# Patient Record
Sex: MALE | URBAN - METROPOLITAN AREA
[De-identification: names, ages, dates, MRNs, and addresses within clinical notes are randomized per-mention and may not be internally consistent; named-entity substitution may affect disease eponyms.]

---

## 2022-06-09 ENCOUNTER — APPOINTMENT (OUTPATIENT)
Age: 87
Setting detail: DERMATOLOGY
End: 2022-06-09

## 2022-06-09 DIAGNOSIS — L21.8 OTHER SEBORRHEIC DERMATITIS: ICD-10-CM

## 2022-06-09 DIAGNOSIS — L57.0 ACTINIC KERATOSIS: ICD-10-CM

## 2022-06-09 DIAGNOSIS — Z71.89 OTHER SPECIFIED COUNSELING: ICD-10-CM

## 2022-06-09 DIAGNOSIS — D18.0 HEMANGIOMA: ICD-10-CM

## 2022-06-09 DIAGNOSIS — L85.3 XEROSIS CUTIS: ICD-10-CM

## 2022-06-09 DIAGNOSIS — D22 MELANOCYTIC NEVI: ICD-10-CM

## 2022-06-09 DIAGNOSIS — L82.1 OTHER SEBORRHEIC KERATOSIS: ICD-10-CM

## 2022-06-09 DIAGNOSIS — L81.4 OTHER MELANIN HYPERPIGMENTATION: ICD-10-CM

## 2022-06-09 PROBLEM — D18.01 HEMANGIOMA OF SKIN AND SUBCUTANEOUS TISSUE: Status: ACTIVE | Noted: 2022-06-09

## 2022-06-09 PROBLEM — D22.5 MELANOCYTIC NEVI OF TRUNK: Status: ACTIVE | Noted: 2022-06-09

## 2022-06-09 PROCEDURE — 99213 OFFICE O/P EST LOW 20 MIN: CPT | Mod: 25

## 2022-06-09 PROCEDURE — OTHER COUNSELING: OTHER

## 2022-06-09 PROCEDURE — OTHER LIQUID NITROGEN: OTHER

## 2022-06-09 PROCEDURE — OTHER MIPS QUALITY: OTHER

## 2022-06-09 PROCEDURE — 17003 DESTRUCT PREMALG LES 2-14: CPT

## 2022-06-09 PROCEDURE — OTHER PRESCRIPTION MEDICATION MANAGEMENT: OTHER

## 2022-06-09 PROCEDURE — OTHER REASSURANCE: OTHER

## 2022-06-09 PROCEDURE — 17000 DESTRUCT PREMALG LESION: CPT

## 2022-06-09 PROCEDURE — OTHER PRESCRIPTION: OTHER

## 2022-06-09 RX ORDER — KETOCONAZOLE 20 MG/ML
SHAMPOO, SUSPENSION TOPICAL BIW
Qty: 120 | Refills: 6 | Status: ERX | COMMUNITY
Start: 2022-06-09

## 2022-06-09 ASSESSMENT — LOCATION DETAILED DESCRIPTION DERM
LOCATION DETAILED: RIGHT ANTERIOR LATERAL MALLEOLUS
LOCATION DETAILED: RIGHT INFERIOR MEDIAL UPPER BACK
LOCATION DETAILED: LEFT INFERIOR UPPER BACK
LOCATION DETAILED: RIGHT SUPERIOR UPPER BACK
LOCATION DETAILED: LEFT ULNAR DORSAL HAND
LOCATION DETAILED: LEFT ULNAR DORSAL HAND
LOCATION DETAILED: RIGHT ELBOW
LOCATION DETAILED: LEFT SUPERIOR UPPER BACK
LOCATION DETAILED: LEFT SUPERIOR MEDIAL LOWER BACK
LOCATION DETAILED: LEFT SUPERIOR HELIX
LOCATION DETAILED: RIGHT MID-UPPER BACK
LOCATION DETAILED: LEFT DISTAL DORSAL FOREARM
LOCATION DETAILED: LEFT SCAPHA
LOCATION DETAILED: LEFT ANKLE
LOCATION DETAILED: RIGHT LATERAL ABDOMEN
LOCATION DETAILED: LEFT MID-UPPER BACK
LOCATION DETAILED: RIGHT DISTAL DORSAL FOREARM
LOCATION DETAILED: HAIR
LOCATION DETAILED: RIGHT INFERIOR LATERAL MIDBACK
LOCATION DETAILED: INFERIOR THORACIC SPINE
LOCATION DETAILED: LEFT ELBOW
LOCATION DETAILED: HAIR
LOCATION DETAILED: LEFT POSTERIOR SHOULDER
LOCATION DETAILED: LEFT LATERAL ABDOMEN
LOCATION DETAILED: RIGHT POSTERIOR SHOULDER
LOCATION DETAILED: EPIGASTRIC SKIN

## 2022-06-09 ASSESSMENT — LOCATION SIMPLE DESCRIPTION DERM
LOCATION SIMPLE: RIGHT SHOULDER
LOCATION SIMPLE: LEFT FOREARM
LOCATION SIMPLE: HAIR
LOCATION SIMPLE: LEFT HAND
LOCATION SIMPLE: RIGHT UPPER BACK
LOCATION SIMPLE: HAIR
LOCATION SIMPLE: RIGHT FOREARM
LOCATION SIMPLE: RIGHT ANKLE
LOCATION SIMPLE: LEFT EAR
LOCATION SIMPLE: LEFT HAND
LOCATION SIMPLE: LEFT ELBOW
LOCATION SIMPLE: LEFT UPPER BACK
LOCATION SIMPLE: ABDOMEN
LOCATION SIMPLE: UPPER BACK
LOCATION SIMPLE: LEFT SHOULDER
LOCATION SIMPLE: LEFT LOWER BACK
LOCATION SIMPLE: LEFT EAR
LOCATION SIMPLE: RIGHT LOWER BACK
LOCATION SIMPLE: RIGHT ELBOW
LOCATION SIMPLE: LEFT ANKLE

## 2022-06-09 ASSESSMENT — LOCATION ZONE DERM
LOCATION ZONE: SCALP
LOCATION ZONE: TRUNK
LOCATION ZONE: HAND
LOCATION ZONE: ARM
LOCATION ZONE: EAR
LOCATION ZONE: SCALP
LOCATION ZONE: EAR
LOCATION ZONE: LEG
LOCATION ZONE: TRUNK
LOCATION ZONE: HAND
LOCATION ZONE: ARM

## 2022-06-09 NOTE — PROCEDURE: PRESCRIPTION MEDICATION MANAGEMENT
Detail Level: Zone
Render In Strict Bullet Format?: No
Continue Regimen: Continue Ketoconazole shampoo three times a week

## 2023-06-12 ENCOUNTER — APPOINTMENT (OUTPATIENT)
Dept: URBAN - METROPOLITAN AREA CLINIC 250 | Age: 88
Setting detail: DERMATOLOGY
End: 2023-06-12

## 2023-06-12 ENCOUNTER — RX ONLY (RX ONLY)
Age: 88
End: 2023-06-12

## 2023-06-12 DIAGNOSIS — L82.1 OTHER SEBORRHEIC KERATOSIS: ICD-10-CM

## 2023-06-12 DIAGNOSIS — Z71.89 OTHER SPECIFIED COUNSELING: ICD-10-CM

## 2023-06-12 DIAGNOSIS — D485 NEOPLASM OF UNCERTAIN BEHAVIOR OF SKIN: ICD-10-CM

## 2023-06-12 DIAGNOSIS — L81.4 OTHER MELANIN HYPERPIGMENTATION: ICD-10-CM

## 2023-06-12 DIAGNOSIS — D18.0 HEMANGIOMA: ICD-10-CM

## 2023-06-12 DIAGNOSIS — D22 MELANOCYTIC NEVI: ICD-10-CM

## 2023-06-12 PROBLEM — D22.5 MELANOCYTIC NEVI OF TRUNK: Status: ACTIVE | Noted: 2023-06-12

## 2023-06-12 PROBLEM — D18.01 HEMANGIOMA OF SKIN AND SUBCUTANEOUS TISSUE: Status: ACTIVE | Noted: 2023-06-12

## 2023-06-12 PROBLEM — D48.5 NEOPLASM OF UNCERTAIN BEHAVIOR OF SKIN: Status: ACTIVE | Noted: 2023-06-12

## 2023-06-12 PROCEDURE — OTHER REASSURANCE: OTHER

## 2023-06-12 PROCEDURE — OTHER MIPS QUALITY: OTHER

## 2023-06-12 PROCEDURE — 11102 TANGNTL BX SKIN SINGLE LES: CPT

## 2023-06-12 PROCEDURE — A4550 SURGICAL TRAYS: HCPCS

## 2023-06-12 PROCEDURE — 99213 OFFICE O/P EST LOW 20 MIN: CPT | Mod: 25

## 2023-06-12 PROCEDURE — OTHER COUNSELING: OTHER

## 2023-06-12 PROCEDURE — OTHER BIOPSY BY SHAVE METHOD: OTHER

## 2023-06-12 RX ORDER — KETOCONAZOLE 20 MG/ML
SHAMPOO, SUSPENSION TOPICAL BIW
Qty: 120 | Refills: 6 | Status: ERX

## 2023-06-12 ASSESSMENT — LOCATION DETAILED DESCRIPTION DERM
LOCATION DETAILED: RIGHT INFERIOR MEDIAL UPPER BACK
LOCATION DETAILED: RIGHT NASAL SIDEWALL
LOCATION DETAILED: INFERIOR THORACIC SPINE
LOCATION DETAILED: EPIGASTRIC SKIN
LOCATION DETAILED: RIGHT DISTAL DORSAL FOREARM
LOCATION DETAILED: LEFT DISTAL DORSAL FOREARM
LOCATION DETAILED: LEFT POSTERIOR SHOULDER
LOCATION DETAILED: RIGHT MID-UPPER BACK
LOCATION DETAILED: RIGHT SUPERIOR UPPER BACK
LOCATION DETAILED: RIGHT SUPERIOR MEDIAL MIDBACK
LOCATION DETAILED: PERIUMBILICAL SKIN

## 2023-06-12 ASSESSMENT — LOCATION ZONE DERM
LOCATION ZONE: ARM
LOCATION ZONE: TRUNK
LOCATION ZONE: NOSE

## 2023-06-12 ASSESSMENT — LOCATION SIMPLE DESCRIPTION DERM
LOCATION SIMPLE: RIGHT NOSE
LOCATION SIMPLE: RIGHT UPPER BACK
LOCATION SIMPLE: ABDOMEN
LOCATION SIMPLE: RIGHT FOREARM
LOCATION SIMPLE: LEFT SHOULDER
LOCATION SIMPLE: RIGHT LOWER BACK
LOCATION SIMPLE: UPPER BACK
LOCATION SIMPLE: LEFT FOREARM

## 2023-06-12 NOTE — PROCEDURE: BIOPSY BY SHAVE METHOD
Silver Nitrate Text: The wound bed was treated with silver nitrate after the biopsy was performed.
Hide Anesthesia Volume?: No
Bill For Surgical Tray: yes
Dressing: bandage
Depth Of Biopsy: dermis
Wound Care: Petrolatum
Electrodesiccation And Curettage Text: The wound bed was treated with electrodesiccation and curettage after the biopsy was performed.
Electrodesiccation Text: The wound bed was treated with electrodesiccation after the biopsy was performed.
Notification Instructions: Patient will be notified of biopsy results. However, patient instructed to call the office if not contacted within 2 weeks.
Cryotherapy Text: The wound bed was treated with cryotherapy after the biopsy was performed.
Biopsy Type: H and E
Curettage Text: The wound bed was treated with curettage after the biopsy was performed.
Anesthesia Volume In Cc (Will Not Render If 0): 0.5
Hemostasis: Otto's
Information: Selecting Yes will display possible errors in your note based on the variables you have selected. This validation is only offered as a suggestion for you. PLEASE NOTE THAT THE VALIDATION TEXT WILL BE REMOVED WHEN YOU FINALIZE YOUR NOTE. IF YOU WANT TO FAX A PRELIMINARY NOTE YOU WILL NEED TO TOGGLE THIS TO 'NO' IF YOU DO NOT WANT IT IN YOUR FAXED NOTE.
X Size Of Lesion In Cm: 0.3
Additional Anesthesia Volume In Cc (Will Not Render If 0): 0
Consent: Written consent was obtained and risks were reviewed including but not limited to scarring, infection, bleeding, scabbing, incomplete removal, nerve damage and allergy to anesthesia.
Post-Care Instructions: I reviewed with the patient in detail post-care instructions. Patient is to keep the biopsy site dry overnight, and then apply bacitracin twice daily until healed. Patient may apply hydrogen peroxide soaks to remove any crusting.
Detail Level: Detailed
Biopsy Method: Dermablade
Billing Type: Third-Party Bill
Anesthesia Type: 1% lidocaine with epinephrine
Type Of Destruction Used: Curettage

## 2023-07-06 ENCOUNTER — APPOINTMENT (OUTPATIENT)
Dept: URBAN - METROPOLITAN AREA CLINIC 250 | Age: 88
Setting detail: DERMATOLOGY
End: 2023-07-07

## 2023-07-06 PROBLEM — C44.311 BASAL CELL CARCINOMA OF SKIN OF NOSE: Status: ACTIVE | Noted: 2023-07-06

## 2023-07-06 PROCEDURE — G6001 ECHO GUIDANCE RADIOTHERAPY: HCPCS

## 2023-07-06 PROCEDURE — OTHER IMAGE GUIDED - SUPERFICIAL RADIOTHERAPY: SIMULATION VISIT: OTHER

## 2023-07-06 PROCEDURE — 77333 RADIATION TREATMENT AID(S): CPT

## 2023-07-06 PROCEDURE — 77280 THER RAD SIMULAJ FIELD SMPL: CPT

## 2023-07-06 PROCEDURE — 77300 RADIATION THERAPY DOSE PLAN: CPT

## 2023-07-06 PROCEDURE — 77261 THER RADIOLOGY TX PLNG SMPL: CPT

## 2023-07-06 PROCEDURE — OTHER IMAGE GUIDED - SUPERFICIAL RADIOTHERAPY: OTHER

## 2023-07-06 NOTE — PROCEDURE: IMAGE GUIDED - SUPERFICIAL RADIOTHERAPY: SIMULATION VISIT
Simulation Documentation: Per the request of Dr. Owusu the patient was seen today for Superficial Radiation Therapy planning requiring initial simulation in preparation for treatment of specific diseased sites. Simulation is necessary to determine the correct patient and treatment portal positioning, to deliver safe and effective Radiotherapy. A high-frequency ultrasound image was acquired prior to treatment today for two- dimensional evaluation of tumor volume and will be repeated per fraction for response to treatment, in addition, to geometric accuracy of field placement. Physician evaluation of the ultrasound tumor depth, width, and breadth will be ongoing through the course of treatment and is deemed medically necessary ensuring efficacy of treatment and determine whether to proceed with delivery of therapeutic. Today’s image and setup were evaluated to determine the initiation of treatment. All appropriate blocking and treatment parameters were verified by the radiation therapist and provider.\\n\\nPer Dr. Owusu, continued per fraction ultrasound guidance and simulation are required for field placement, measurement of tumor depth, width and breadth, progress, and edema monitoring.\\n\\nPer the request of Dr. Owusu , continuing medical physics review as per radiotherapy standard of care post every 5th fraction for the patient, including assessment of treatment parameters,  of dose delivery, and review of patient treatment documentation in support of the provider, is ordered, ensuring efficacy and continued safe delivery of radiotherapy. Included in the physics check is a review of patient setup information, all pertinent simulation and treatment photograph(s) checks, prescription, dose calculation verification, per fraction dose charted correctly, elapsed days and treatment days correctly charted, cumulative dose correct, and review of any prescription changes. Continued medical physics review post every 5th fraction of radiotherapy is requested by the provider for appropriate radiotherapy management and is deemed medically necessary and standard of care.

## 2023-07-06 NOTE — PROCEDURE: IMAGE GUIDED - SUPERFICIAL RADIOTHERAPY: SIMULATION VISIT
Bill For Treatment Planning: Yes- (Simple Planning- 1 Site: [id number]) Bill For Treatment Planning: Yes- (Simple Planning- 1 Site: [id number])

## 2023-07-06 NOTE — PROCEDURE: IMAGE GUIDED - SUPERFICIAL RADIOTHERAPY
Add Treatment Time Calculation?: No
Lesion Dimensions-X Axis In Cm: 0.3
Bill For Physics Consultation: No - Render Text Only
Time, Dose, Fractionation Factor (Tdf) For Prescription 1: 98
Additional Fraction(S) Needed:: 0
Treatment Time (Min): 0.47
Energy (Kv): 100
Total Dose For Prescription 1 (Cgy): 5517.8
Shield Size In Cm: 1.5
Field Number: 1
Applicator Size In Cm: 2.0 cm
Physics Documentation: (Physics Check Verbiage)
Lesion Margin Size In Cm: 0.5
Treatments Per Week: 3
Number Of Fractions For Prescription 1: 20
Detail Level: Detailed
Daily Dose (Cgy): 275.89

## 2023-07-06 NOTE — PROCEDURE: IMAGE GUIDED - SUPERFICIAL RADIOTHERAPY: SIMULATION VISIT
Additional Comments (Add Customization Of Note Here): Treatment area shows a well healed biopsy site.

## 2023-07-11 ENCOUNTER — APPOINTMENT (OUTPATIENT)
Dept: URBAN - METROPOLITAN AREA CLINIC 250 | Age: 88
Setting detail: DERMATOLOGY
End: 2023-07-12

## 2023-07-11 PROBLEM — C44.311 BASAL CELL CARCINOMA OF SKIN OF NOSE: Status: ACTIVE | Noted: 2023-07-11

## 2023-07-11 PROCEDURE — OTHER IMAGE GUIDED - SUPERFICIAL RADIOTHERAPY: TREATMENT VISIT: OTHER

## 2023-07-11 PROCEDURE — OTHER IMAGE GUIDED - SUPERFICIAL RADIOTHERAPY: OTHER

## 2023-07-11 PROCEDURE — 77401 RADIATION TX DELIVERY SUPFC: CPT

## 2023-07-11 PROCEDURE — 77280 THER RAD SIMULAJ FIELD SMPL: CPT

## 2023-07-11 PROCEDURE — G6001 ECHO GUIDANCE RADIOTHERAPY: HCPCS | Mod: XU

## 2023-07-11 NOTE — PROCEDURE: IMAGE GUIDED - SUPERFICIAL RADIOTHERAPY
Time, Dose, Fractionation Factor (Tdf) For Prescription 1: 98
Applicator Size In Cm: 2.0 cm
Add A Fifth Interruption?: No
Lesion Margin Size In Cm: 0.5
Detail Level: Detailed
Lesion Dimensions-Y Axis In Cm: 0.3
Field Number: 1
Total Dose For Prescription 1 (Cgy): 5517.8
Number Of Fractions For Prescription 1: 20
Shield Size In Cm: 1.5
Daily Dose (Cgy): 275.89
Treatment Time (Min): 0.47
Energy (Kv): 100
Additional Fraction(S) Needed:: 0
Treatments Per Week: 3
Bill For Physics Consultation: No - Render Text Only
Physics Documentation: (Physics Check Verbiage)

## 2023-07-11 NOTE — PROCEDURE: IMAGE GUIDED - SUPERFICIAL RADIOTHERAPY: TREATMENT VISIT
Was Ultrasound Performed Today?: Yes
Additional Change To Daily Dosage Administered Mid Treatment?: No
Treatment Documentation: This patient has been treated today with image-guided superficial radiation therapy for non-melanoma skin cancer. Written informed consent has been previously obtained from this patient for this treatment. This consent is documented in the patient's chart. The patient gave verbal consent to continue treatment today. The patient was treated with a specific radiation dose and setup as prescribed by the provider listed on this visit note. A Radiation Therapist performed administration of radiation under the supervision of a provider. The treatment parameters and cumulative dose are indicated above. Prior to administering the radiation, the patient underwent a verification therapeutic radiology simulation-aided field setting defining relevant normal and abnormal target anatomy and acquiring images with separate and distinct diagnostic high-frequency ultrasound to delineate tissues and determine whether to proceed with delivery of therapeutic, in addition to retrieve data necessary to develop an optimal radiation treatment process for the patient. The field placement simulation documents any change seen in overall tumor volume documented in the patient’s record, allows the clinician to indicate any needed changes in the treatment plan and/or prescription, provides diagnostic evaluation as the basis for performing the therapeutic procedure, and clearly identifies the information needed to decide to proceed with the therapeutic procedure. This process includes verification of the treatment port(s) and proper treatment positioning. All treatment ports were photographed within electronic medical records. The patient's lead blocking along with gross tumor volume and margin was confirmed. Considering superficial radiotherapy is clinical in setup, this requires the physician and radiation therapist to clarify the location interest being treated against initial images, ultrasound, pathology, and patient anatomy. Care was taken to ensure torres treated were geometrically accurate and properly positioned using therapeutic radiology simulation-aided field setting verification per fraction. This process is also utilized to determine if any prescription or setup changes are necessary. These steps are therefore medically necessary to ensure safe and effective administration of radiation. Ongoing therapeutic radiology simulation-aided field setting verification is ordered throughout the course of therapy.\\n\\nA high-frequency ultrasound image was acquired today for a two-dimensional evaluation of the tumor volume, depth, width, breadth, review of prior response to treatment, provide geometric accuracy of field placement, and determine whether to proceed with therapeutic delivery.\\n\\nThe field placement and ultrasound imaging, per fraction, is separate and distinct from the initial simulation and is an important task in providing safe administration of superficial radiation therapy. Physician evaluation of the ultrasound information will be ongoing throughout the course of treatment and is deemed medically necessary to ensure the efficacy of treatment, whether to proceed with therapeutic delivery, and determine any necessary changes. Today's images were evaluated for determination of continuation of treatment with the current plan or with necessary changes as appropriate. Additionally, the use of ultrasound visualization and targeted assessment allows the patient to be able to see their cancer(s) progress, encouraging the patient to complete and maintain compliance through the full course of prescribed radiotherapy. Per Dr. Owusu, continued ultrasound guidance and therapeutic radiology simulation-aided field setting verification per fraction is required for field placement, measurement of tumor depth, tissue evaluation, progress, acute effect monitoring, and determination for therapeutic treatment delivery is appropriate.
Daily Dosage (Cgy): 275.89
Calculate Total Cumulative Dose Automatically Or Manually: Manually
Additional Comments (Add Customization Of Note Here): Treatment area shows a well healed biopsy site.
Ultrasound Not Used Text: Ultrasound was not performed today due to
Ultrasound Used Text: High frequency ultrasound depth is 0.95 mm, which is 0.05 mm in difference from previous imaging.
Fraction Number: 1
Energy (Kv): 100
Add X Modifier?: RYAN - Unusual Non-Overlapping Service

## 2023-07-12 ENCOUNTER — APPOINTMENT (OUTPATIENT)
Dept: URBAN - METROPOLITAN AREA CLINIC 250 | Age: 88
Setting detail: DERMATOLOGY
End: 2023-07-16

## 2023-07-12 PROBLEM — C44.311 BASAL CELL CARCINOMA OF SKIN OF NOSE: Status: ACTIVE | Noted: 2023-07-12

## 2023-07-12 PROCEDURE — OTHER IMAGE GUIDED - SUPERFICIAL RADIOTHERAPY: OTHER

## 2023-07-12 PROCEDURE — G6001 ECHO GUIDANCE RADIOTHERAPY: HCPCS | Mod: XU

## 2023-07-12 PROCEDURE — 77280 THER RAD SIMULAJ FIELD SMPL: CPT

## 2023-07-12 PROCEDURE — OTHER IMAGE GUIDED - SUPERFICIAL RADIOTHERAPY: TREATMENT VISIT: OTHER

## 2023-07-12 PROCEDURE — 77401 RADIATION TX DELIVERY SUPFC: CPT

## 2023-07-12 NOTE — PROCEDURE: IMAGE GUIDED - SUPERFICIAL RADIOTHERAPY: TREATMENT VISIT
Bill For Treatment (35006)?: Yes
Daily Dosage (Cgy): 275.89
Additional Change To Daily Dosage Administered Mid Treatment?: No
Ultrasound Not Used Text: Ultrasound was not performed today due to
Add X Modifier?: RYAN - Unusual Non-Overlapping Service
Calculate Total Cumulative Dose Automatically Or Manually: Manually
Total Cumulative Dose (Cgy): 551.78
Treatment Documentation: This patient has been treated today with image-guided superficial radiation therapy for non-melanoma skin cancer. Written informed consent has been previously obtained from this patient for this treatment. This consent is documented in the patient's chart. The patient gave verbal consent to continue treatment today. The patient was treated with a specific radiation dose and setup as prescribed by the provider listed on this visit note. A Radiation Therapist performed administration of radiation under the supervision of a provider. The treatment parameters and cumulative dose are indicated above. Prior to administering the radiation, the patient underwent a verification therapeutic radiology simulation-aided field setting defining relevant normal and abnormal target anatomy and acquiring images with separate and distinct diagnostic high-frequency ultrasound to delineate tissues and determine whether to proceed with delivery of therapeutic, in addition to retrieve data necessary to develop an optimal radiation treatment process for the patient. The field placement simulation documents any change seen in overall tumor volume documented in the patient’s record, allows the clinician to indicate any needed changes in the treatment plan and/or prescription, provides diagnostic evaluation as the basis for performing the therapeutic procedure, and clearly identifies the information needed to decide to proceed with the therapeutic procedure. This process includes verification of the treatment port(s) and proper treatment positioning. All treatment ports were photographed within electronic medical records. The patient's lead blocking along with gross tumor volume and margin was confirmed. Considering superficial radiotherapy is clinical in setup, this requires the physician and radiation therapist to clarify the location interest being treated against initial images, ultrasound, pathology, and patient anatomy. Care was taken to ensure torres treated were geometrically accurate and properly positioned using therapeutic radiology simulation-aided field setting verification per fraction. This process is also utilized to determine if any prescription or setup changes are necessary. These steps are therefore medically necessary to ensure safe and effective administration of radiation. Ongoing therapeutic radiology simulation-aided field setting verification is ordered throughout the course of therapy.\\n\\nA high-frequency ultrasound image was acquired today for a two-dimensional evaluation of the tumor volume, depth, width, breadth, review of prior response to treatment, provide geometric accuracy of field placement, and determine whether to proceed with therapeutic delivery.\\n\\nThe field placement and ultrasound imaging, per fraction, is separate and distinct from the initial simulation and is an important task in providing safe administration of superficial radiation therapy. Physician evaluation of the ultrasound information will be ongoing throughout the course of treatment and is deemed medically necessary to ensure the efficacy of treatment, whether to proceed with therapeutic delivery, and determine any necessary changes. Today's images were evaluated for determination of continuation of treatment with the current plan or with necessary changes as appropriate. Additionally, the use of ultrasound visualization and targeted assessment allows the patient to be able to see their cancer(s) progress, encouraging the patient to complete and maintain compliance through the full course of prescribed radiotherapy. Per Dr. Owusu, continued ultrasound guidance and therapeutic radiology simulation-aided field setting verification per fraction is required for field placement, measurement of tumor depth, tissue evaluation, progress, acute effect monitoring, and determination for therapeutic treatment delivery is appropriate.
Prescription Used: 1
Energy (Kv): 100
Additional Comments (Add Customization Of Note Here): Patient had no issues to report.
Fraction Number: 2
Ultrasound Used Text: High frequency ultrasound depth is 0.96 mm, which is 0.01 mm in difference from previous imaging.

## 2023-07-12 NOTE — PROCEDURE: IMAGE GUIDED - SUPERFICIAL RADIOTHERAPY
Add A Fifth Interruption?: No
Energy (Kv): 100
Applicator Size In Cm: 2.0 cm
Total Dose For Prescription 1 (Cgy): 5517.8
Shield Size In Cm: 1.5
Treatments Per Week: 3
Number Of Fractions For Prescription 1: 20
Bill For Physics Consultation: No - Render Text Only
Additional Fraction(S) Needed:: 0
Lesion Margin Size In Cm: 0.5
Lesion Dimensions-Y Axis In Cm: 0.3
Daily Dose (Cgy): 275.89
Time, Dose, Fractionation Factor (Tdf) For Prescription 1: 98
Detail Level: Detailed
Physics Documentation: (Physics Check Verbiage)
Treatment Time (Min): 0.47
Field Number: 1

## 2023-07-13 ENCOUNTER — APPOINTMENT (OUTPATIENT)
Dept: URBAN - METROPOLITAN AREA CLINIC 250 | Age: 88
Setting detail: DERMATOLOGY
End: 2023-07-16

## 2023-07-13 PROBLEM — C44.311 BASAL CELL CARCINOMA OF SKIN OF NOSE: Status: ACTIVE | Noted: 2023-07-13

## 2023-07-13 PROCEDURE — G6001 ECHO GUIDANCE RADIOTHERAPY: HCPCS | Mod: XU

## 2023-07-13 PROCEDURE — OTHER IMAGE GUIDED - SUPERFICIAL RADIOTHERAPY: OTHER

## 2023-07-13 PROCEDURE — 77401 RADIATION TX DELIVERY SUPFC: CPT

## 2023-07-13 PROCEDURE — 77280 THER RAD SIMULAJ FIELD SMPL: CPT

## 2023-07-13 PROCEDURE — OTHER IMAGE GUIDED - SUPERFICIAL RADIOTHERAPY: TREATMENT VISIT: OTHER

## 2023-07-13 NOTE — PROCEDURE: IMAGE GUIDED - SUPERFICIAL RADIOTHERAPY: TREATMENT VISIT
Bill For Set Radiation Therapy Field (39808)?: Yes
Change Daily Dosage Administered Mid Treatment?: No
Additional Comments (Add Customization Of Note Here): Treatment area shows a well healed biopsy site.
Energy (Kv): 100
Calculate Total Cumulative Dose Automatically Or Manually: Manually
Daily Dosage (Cgy): 275.89
Total Cumulative Dose (Cgy): 827.67
Fraction Number: 3
Ultrasound Not Used Text: Ultrasound was not performed today due to
Prescription Used: 1
Treatment Documentation: This patient has been treated today with image-guided superficial radiation therapy for non-melanoma skin cancer. Written informed consent has been previously obtained from this patient for this treatment. This consent is documented in the patient's chart. The patient gave verbal consent to continue treatment today. The patient was treated with a specific radiation dose and setup as prescribed by the provider listed on this visit note. A Radiation Therapist performed administration of radiation under the supervision of a provider. The treatment parameters and cumulative dose are indicated above. Prior to administering the radiation, the patient underwent a verification therapeutic radiology simulation-aided field setting defining relevant normal and abnormal target anatomy and acquiring images with separate and distinct diagnostic high-frequency ultrasound to delineate tissues and determine whether to proceed with delivery of therapeutic, in addition to retrieve data necessary to develop an optimal radiation treatment process for the patient. The field placement simulation documents any change seen in overall tumor volume documented in the patient’s record, allows the clinician to indicate any needed changes in the treatment plan and/or prescription, provides diagnostic evaluation as the basis for performing the therapeutic procedure, and clearly identifies the information needed to decide to proceed with the therapeutic procedure. This process includes verification of the treatment port(s) and proper treatment positioning. All treatment ports were photographed within electronic medical records. The patient's lead blocking along with gross tumor volume and margin was confirmed. Considering superficial radiotherapy is clinical in setup, this requires the physician and radiation therapist to clarify the location interest being treated against initial images, ultrasound, pathology, and patient anatomy. Care was taken to ensure torres treated were geometrically accurate and properly positioned using therapeutic radiology simulation-aided field setting verification per fraction. This process is also utilized to determine if any prescription or setup changes are necessary. These steps are therefore medically necessary to ensure safe and effective administration of radiation. Ongoing therapeutic radiology simulation-aided field setting verification is ordered throughout the course of therapy.\\n\\nA high-frequency ultrasound image was acquired today for a two-dimensional evaluation of the tumor volume, depth, width, breadth, review of prior response to treatment, provide geometric accuracy of field placement, and determine whether to proceed with therapeutic delivery.\\n\\nThe field placement and ultrasound imaging, per fraction, is separate and distinct from the initial simulation and is an important task in providing safe administration of superficial radiation therapy. Physician evaluation of the ultrasound information will be ongoing throughout the course of treatment and is deemed medically necessary to ensure the efficacy of treatment, whether to proceed with therapeutic delivery, and determine any necessary changes. Today's images were evaluated for determination of continuation of treatment with the current plan or with necessary changes as appropriate. Additionally, the use of ultrasound visualization and targeted assessment allows the patient to be able to see their cancer(s) progress, encouraging the patient to complete and maintain compliance through the full course of prescribed radiotherapy. Per Dr. Owusu, continued ultrasound guidance and therapeutic radiology simulation-aided field setting verification per fraction is required for field placement, measurement of tumor depth, tissue evaluation, progress, acute effect monitoring, and determination for therapeutic treatment delivery is appropriate.
Ultrasound Used Text: High frequency ultrasound depth is 0.98 mm, which is 0.02 mm in difference from previous imaging.
Add X Modifier?: RYAN - Unusual Non-Overlapping Service

## 2023-07-13 NOTE — PROCEDURE: IMAGE GUIDED - SUPERFICIAL RADIOTHERAPY
Detail Level: Detailed
Number Of Fractions For Prescription 1: 20
Lesion Dimensions-X Axis In Cm: 0.3
Bill For Physics Consultation: No - Render Text Only
Bill For Dosimetry Calculation (02668) - Select Yes Only If Not Concurrently Performing Simulation Or Decay And Dose Adjustment: No
Field Number: 1
Daily Dose (Cgy): 275.89
Physics Documentation: (Physics Check Verbiage)
Time, Dose, Fractionation Factor (Tdf) For Prescription 1: 98
Treatment Time (Min): 0.47
Applicator Size In Cm: 2.0 cm
Additional Fraction(S) Needed:: 0
Shield Size In Cm: 1.5
Energy (Kv): 100
Lesion Margin Size In Cm: 0.5
Total Dose For Prescription 1 (Cgy): 5517.8
Treatments Per Week: 3

## 2023-07-18 ENCOUNTER — APPOINTMENT (OUTPATIENT)
Dept: URBAN - METROPOLITAN AREA CLINIC 250 | Age: 88
Setting detail: DERMATOLOGY
End: 2023-07-19

## 2023-07-18 PROBLEM — C44.311 BASAL CELL CARCINOMA OF SKIN OF NOSE: Status: ACTIVE | Noted: 2023-07-18

## 2023-07-18 PROCEDURE — 77401 RADIATION TX DELIVERY SUPFC: CPT

## 2023-07-18 PROCEDURE — OTHER IMAGE GUIDED - SUPERFICIAL RADIOTHERAPY: OTHER

## 2023-07-18 PROCEDURE — OTHER IMAGE GUIDED - SUPERFICIAL RADIOTHERAPY: TREATMENT VISIT: OTHER

## 2023-07-18 PROCEDURE — 77280 THER RAD SIMULAJ FIELD SMPL: CPT

## 2023-07-18 PROCEDURE — G6001 ECHO GUIDANCE RADIOTHERAPY: HCPCS | Mod: XU

## 2023-07-18 NOTE — PROCEDURE: IMAGE GUIDED - SUPERFICIAL RADIOTHERAPY
Add A Second Interruption?: No
Daily Dose (Cgy): 275.89
Field Number: 1
Additional Fraction(S) Needed:: 0
Time, Dose, Fractionation Factor (Tdf) For Prescription 1: 98
Applicator Size In Cm: 2.0 cm
Treatment Time (Min): 0.47
Energy (Kv): 100
Shield Size In Cm: 1.5
Bill For Physics Consultation: No - Render Text Only
Total Dose For Prescription 1 (Cgy): 5517.8
Lesion Margin Size In Cm: 0.5
Lesion Dimensions-Y Axis In Cm: 0.3
Physics Documentation: (Physics Check Verbiage)
Treatments Per Week: 3
Detail Level: Detailed
Number Of Fractions For Prescription 1: 20

## 2023-07-19 ENCOUNTER — APPOINTMENT (OUTPATIENT)
Dept: URBAN - METROPOLITAN AREA CLINIC 250 | Age: 88
Setting detail: DERMATOLOGY
End: 2023-07-20

## 2023-07-19 PROBLEM — C44.311 BASAL CELL CARCINOMA OF SKIN OF NOSE: Status: ACTIVE | Noted: 2023-07-19

## 2023-07-19 PROCEDURE — OTHER IMAGE GUIDED - SUPERFICIAL RADIOTHERAPY: TREATMENT VISIT: OTHER

## 2023-07-19 PROCEDURE — 77401 RADIATION TX DELIVERY SUPFC: CPT

## 2023-07-19 PROCEDURE — 77280 THER RAD SIMULAJ FIELD SMPL: CPT

## 2023-07-19 PROCEDURE — 77427 RADIATION TX MANAGEMENT X5: CPT

## 2023-07-19 PROCEDURE — G6001 ECHO GUIDANCE RADIOTHERAPY: HCPCS | Mod: XU

## 2023-07-19 PROCEDURE — OTHER IMAGE GUIDED - SUPERFICIAL RADIOTHERAPY: EVALUATION VISIT: OTHER

## 2023-07-19 PROCEDURE — OTHER IMAGE GUIDED - SUPERFICIAL RADIOTHERAPY: OTHER

## 2023-07-19 NOTE — PROCEDURE: IMAGE GUIDED - SUPERFICIAL RADIOTHERAPY
Treatment Time (Min): 0.47
Additional Fraction(S) Needed:: 0
Applicator Size In Cm: 2.0 cm
Bill For Dosimetry Calculation (21943): No
Shield Size In Cm: 1.5
Physics Documentation: (Physics Check Verbiage)
Bill For Physics Consultation: No - Render Text Only
Energy (Kv): 100
Lesion Margin Size In Cm: 0.5
Total Dose For Prescription 1 (Cgy): 5517.8
Treatments Per Week: 3
Lesion Dimensions-Y Axis In Cm: 0.3
Detail Level: Detailed
Number Of Fractions For Prescription 1: 20
Field Number: 1
Daily Dose (Cgy): 275.89
Time, Dose, Fractionation Factor (Tdf) For Prescription 1: 98

## 2023-07-19 NOTE — PROCEDURE: IMAGE GUIDED - SUPERFICIAL RADIOTHERAPY: TREATMENT VISIT
Daily Dosage (Cgy): 275.89
Additional Change To Daily Dosage Administered Mid Treatment?: No
Ultrasound Not Used Text: Ultrasound was not performed today due to
Was Ultrasound Performed Today?: Yes
Fraction Number: 5
Prescription Used: 1
Treatment Documentation: This patient has been treated today with image-guided superficial radiation therapy for non-melanoma skin cancer. Written informed consent has been previously obtained from this patient for this treatment. This consent is documented in the patient's chart. The patient gave verbal consent to continue treatment today. The patient was treated with a specific radiation dose and setup as prescribed by the provider listed on this visit note. A Radiation Therapist performed administration of radiation under the supervision of a provider. The treatment parameters and cumulative dose are indicated above. Prior to administering the radiation, the patient underwent a verification therapeutic radiology simulation-aided field setting defining relevant normal and abnormal target anatomy and acquiring images with separate and distinct diagnostic high-frequency ultrasound to delineate tissues and determine whether to proceed with delivery of therapeutic, in addition to retrieve data necessary to develop an optimal radiation treatment process for the patient. The field placement simulation documents any change seen in overall tumor volume documented in the patient’s record, allows the clinician to indicate any needed changes in the treatment plan and/or prescription, provides diagnostic evaluation as the basis for performing the therapeutic procedure, and clearly identifies the information needed to decide to proceed with the therapeutic procedure. This process includes verification of the treatment port(s) and proper treatment positioning. All treatment ports were photographed within electronic medical records. The patient's lead blocking along with gross tumor volume and margin was confirmed. Considering superficial radiotherapy is clinical in setup, this requires the physician and radiation therapist to clarify the location interest being treated against initial images, ultrasound, pathology, and patient anatomy. Care was taken to ensure torres treated were geometrically accurate and properly positioned using therapeutic radiology simulation-aided field setting verification per fraction. This process is also utilized to determine if any prescription or setup changes are necessary. These steps are therefore medically necessary to ensure safe and effective administration of radiation. Ongoing therapeutic radiology simulation-aided field setting verification is ordered throughout the course of therapy.\\n\\nA high-frequency ultrasound image was acquired today for a two-dimensional evaluation of the tumor volume, depth, width, breadth, review of prior response to treatment, provide geometric accuracy of field placement, and determine whether to proceed with therapeutic delivery.\\n\\nThe field placement and ultrasound imaging, per fraction, is separate and distinct from the initial simulation and is an important task in providing safe administration of superficial radiation therapy. Physician evaluation of the ultrasound information will be ongoing throughout the course of treatment and is deemed medically necessary to ensure the efficacy of treatment, whether to proceed with therapeutic delivery, and determine any necessary changes. Today's images were evaluated for determination of continuation of treatment with the current plan or with necessary changes as appropriate. Additionally, the use of ultrasound visualization and targeted assessment allows the patient to be able to see their cancer(s) progress, encouraging the patient to complete and maintain compliance through the full course of prescribed radiotherapy. Per Dr. Owusu, continued ultrasound guidance and therapeutic radiology simulation-aided field setting verification per fraction is required for field placement, measurement of tumor depth, tissue evaluation, progress, acute effect monitoring, and determination for therapeutic treatment delivery is appropriate.
Calculate Total Cumulative Dose Automatically Or Manually: Manually
Ultrasound Used Text: High frequency ultrasound depth is 0.89 mm, which is 0.0 mm in difference from previous imaging.
Total Cumulative Dose (Cgy): 1379.45
Add X Modifier?: RYAN - Unusual Non-Overlapping Service
Energy (Kv): 100
Additional Comments (Add Customization Of Note Here): Treatment area shows faint erythema.

## 2023-07-19 NOTE — PROCEDURE: IMAGE GUIDED - SUPERFICIAL RADIOTHERAPY: EVALUATION VISIT
Is This Visit For Evaluation During Treatment Or Follow Up Post Treatment?: evaluation
Show Ultrasound In Note?: No
Bill For Evaluation (23282)?: Yes
Follow Up Plan: Per the request of Dr. Owusu, patient was seen today for a 6 week follow up for Superficial Radiation Therapy. Physician evaluation of the ultrasound tumor depth, width, and breadth was ongoing through course of treatment and is deemed medically necessary ensuring efficacy of treatment. All appropriate blocking and treatment parameters verified by radiation therapist according to initial simulation. A high frequency ultrasound image was acquired today for two-dimensional evaluation of treatment volume and response to treatment, in addition, geometric accuracy of field placement. Today’s image was evaluated, lesion not detected on US.\\n\\nObjectively, the treated radiation area was evaluated with regards to erythema, atrophy, scale, crusting, erosion, ulceration, edema, purpura, tenderness, warmth, drainage, and any other finding. Patient to follow up for routine visits.
Radiation Therapy Oncology [facility] (Rtog) Score: 1
Assessment: Appropriate reaction
Ultrasound Used Text: Ultrasound showed no evidence of disease.
Ultrasound Not Used Text: Ultrasound was not performed today due to
Additional Comments (Add Customization Of Note Here): Patient had no issues to report.
Evaluation Plan: The patient is undergoing superficial radiation therapy for skin cancer and presents for weekly evaluation and management. Per protocol and as documented on the flow sheet, the patient was questioned as to subjective redness, pruritus, pain, drainage, fatigue, or any other symptoms. Objectively, the radiation area was evaluated with regards to erythema, atrophy, scale, crusting, erosion, ulceration, edema, purpura, tenderness, warmth, drainage, and any other findings. The plan was extensively reviewed including dose and dosing schedule. The simulation and clinical setup were also reviewed as were external and any internal shields and based on this review the appropriateness and sufficiency of treatment was determined.\\n\\nA high-frequency ultrasound image was acquired today for a two-dimensional evaluation of the tumor volume, depth, width, breadth, review of prior response to treatment, provide geometric accuracy of field placement, and determine whether to proceed with therapeutic delivery.\\n\\nPer Dr. Owusu, continued ultrasound guidance and therapeutic radiology simulation-aided field setting verification per fraction is required for field placement, measurement of tumor depth, tissues evaluation, progress, acute effect monitoring, and determination for therapeutic treatment delivery is appropriate.

## 2023-07-20 ENCOUNTER — APPOINTMENT (OUTPATIENT)
Dept: URBAN - METROPOLITAN AREA CLINIC 250 | Age: 88
Setting detail: DERMATOLOGY
End: 2023-07-24

## 2023-07-20 PROBLEM — C44.311 BASAL CELL CARCINOMA OF SKIN OF NOSE: Status: ACTIVE | Noted: 2023-07-20

## 2023-07-20 PROCEDURE — 77401 RADIATION TX DELIVERY SUPFC: CPT

## 2023-07-20 PROCEDURE — G6001 ECHO GUIDANCE RADIOTHERAPY: HCPCS | Mod: XU

## 2023-07-20 PROCEDURE — OTHER IMAGE GUIDED - SUPERFICIAL RADIOTHERAPY: TREATMENT VISIT: OTHER

## 2023-07-20 PROCEDURE — OTHER IMAGE GUIDED - SUPERFICIAL RADIOTHERAPY: OTHER

## 2023-07-20 PROCEDURE — 77280 THER RAD SIMULAJ FIELD SMPL: CPT

## 2023-07-20 NOTE — PROCEDURE: IMAGE GUIDED - SUPERFICIAL RADIOTHERAPY
Treatment Time (Min): 0.47
Energy (Kv): 100
Additional Fraction(S) Needed:: 0
Add A Second Prescription?: No
Total Dose For Prescription 1 (Cgy): 5517.8
Applicator Size In Cm: 2.0 cm
Lesion Margin Size In Cm: 0.5
Lesion Dimensions-X Axis In Cm: 0.3
Treatments Per Week: 3
Field Number: 1
Number Of Fractions For Prescription 1: 20
Bill For Physics Consultation: No - Render Text Only
Daily Dose (Cgy): 275.89
Detail Level: Detailed
Physics Documentation: (Physics Check Verbiage)
Shield Size In Cm: 1.5
Time, Dose, Fractionation Factor (Tdf) For Prescription 1: 98

## 2023-07-20 NOTE — PROCEDURE: IMAGE GUIDED - SUPERFICIAL RADIOTHERAPY: TREATMENT VISIT
Ultrasound Not Used Text: Ultrasound was not performed today due to
Fraction Number: 6
Additional Change To Daily Dosage Administered Mid Treatment?: No
Prescription Used: 1
Treatment Documentation: This patient has been treated today with image-guided superficial radiation therapy for non-melanoma skin cancer. Written informed consent has been previously obtained from this patient for this treatment. This consent is documented in the patient's chart. The patient gave verbal consent to continue treatment today. The patient was treated with a specific radiation dose and setup as prescribed by the provider listed on this visit note. A Radiation Therapist performed administration of radiation under the supervision of a provider. The treatment parameters and cumulative dose are indicated above. Prior to administering the radiation, the patient underwent a verification therapeutic radiology simulation-aided field setting defining relevant normal and abnormal target anatomy and acquiring images with separate and distinct diagnostic high-frequency ultrasound to delineate tissues and determine whether to proceed with delivery of therapeutic, in addition to retrieve data necessary to develop an optimal radiation treatment process for the patient. The field placement simulation documents any change seen in overall tumor volume documented in the patient’s record, allows the clinician to indicate any needed changes in the treatment plan and/or prescription, provides diagnostic evaluation as the basis for performing the therapeutic procedure, and clearly identifies the information needed to decide to proceed with the therapeutic procedure. This process includes verification of the treatment port(s) and proper treatment positioning. All treatment ports were photographed within electronic medical records. The patient's lead blocking along with gross tumor volume and margin was confirmed. Considering superficial radiotherapy is clinical in setup, this requires the physician and radiation therapist to clarify the location interest being treated against initial images, ultrasound, pathology, and patient anatomy. Care was taken to ensure torres treated were geometrically accurate and properly positioned using therapeutic radiology simulation-aided field setting verification per fraction. This process is also utilized to determine if any prescription or setup changes are necessary. These steps are therefore medically necessary to ensure safe and effective administration of radiation. Ongoing therapeutic radiology simulation-aided field setting verification is ordered throughout the course of therapy.\\n\\nA high-frequency ultrasound image was acquired today for a two-dimensional evaluation of the tumor volume, depth, width, breadth, review of prior response to treatment, provide geometric accuracy of field placement, and determine whether to proceed with therapeutic delivery.\\n\\nThe field placement and ultrasound imaging, per fraction, is separate and distinct from the initial simulation and is an important task in providing safe administration of superficial radiation therapy. Physician evaluation of the ultrasound information will be ongoing throughout the course of treatment and is deemed medically necessary to ensure the efficacy of treatment, whether to proceed with therapeutic delivery, and determine any necessary changes. Today's images were evaluated for determination of continuation of treatment with the current plan or with necessary changes as appropriate. Additionally, the use of ultrasound visualization and targeted assessment allows the patient to be able to see their cancer(s) progress, encouraging the patient to complete and maintain compliance through the full course of prescribed radiotherapy. Per Dr. Owusu, continued ultrasound guidance and therapeutic radiology simulation-aided field setting verification per fraction is required for field placement, measurement of tumor depth, tissue evaluation, progress, acute effect monitoring, and determination for therapeutic treatment delivery is appropriate.
Ultrasound Used Text: High frequency ultrasound depth is 0.87 mm, which is 0.02 mm in difference from previous imaging.
Add X Modifier?: RYAN - Unusual Non-Overlapping Service
Bill For Treatment (68667)?: Yes
Energy (Kv): 100
Additional Comments (Add Customization Of Note Here): Treatment area is showing faint erythema.
Calculate Total Cumulative Dose Automatically Or Manually: Manually
Daily Dosage (Cgy): 275.89
Total Cumulative Dose (Cgy): 1655.34

## 2023-07-24 ENCOUNTER — APPOINTMENT (OUTPATIENT)
Dept: URBAN - METROPOLITAN AREA CLINIC 250 | Age: 88
Setting detail: DERMATOLOGY
End: 2023-07-25

## 2023-07-24 PROBLEM — C44.311 BASAL CELL CARCINOMA OF SKIN OF NOSE: Status: ACTIVE | Noted: 2023-07-24

## 2023-07-24 PROCEDURE — 77401 RADIATION TX DELIVERY SUPFC: CPT

## 2023-07-24 PROCEDURE — 77280 THER RAD SIMULAJ FIELD SMPL: CPT

## 2023-07-24 PROCEDURE — OTHER IMAGE GUIDED - SUPERFICIAL RADIOTHERAPY: TREATMENT VISIT: OTHER

## 2023-07-24 PROCEDURE — G6001 ECHO GUIDANCE RADIOTHERAPY: HCPCS | Mod: XU

## 2023-07-24 PROCEDURE — OTHER IMAGE GUIDED - SUPERFICIAL RADIOTHERAPY: OTHER

## 2023-07-24 NOTE — PROCEDURE: IMAGE GUIDED - SUPERFICIAL RADIOTHERAPY
Detail Level: Detailed
Field Number: 1
Lesion Dimensions-X Axis In Cm: 0.3
Lesion Margin Size In Cm: 0.5
Shield Size In Cm: 1.5x1.5
Applicator Size In Cm: 2.0 cm
Energy (Kv): 100
Treatment Time (Min): 0.47
Time, Dose, Fractionation Factor (Tdf) For Prescription 1: 98
Daily Dose (Cgy): 275.89
Number Of Fractions For Prescription 1: 20
Treatments Per Week: 3
Total Dose For Prescription 1 (Cgy): 5517.80
Add A Second Prescription?: No
Additional Fraction(S) Needed:: 0
Bill For Physics Consultation: No - Render Text Only
Physics Documentation: (Physics Check Verbiage)

## 2023-07-24 NOTE — PROCEDURE: IMAGE GUIDED - SUPERFICIAL RADIOTHERAPY: TREATMENT VISIT
Ultrasound Not Used Text: Ultrasound was not performed today due to
Prescription Used: 1
Bill For Treatment (68439)?: Yes
Additional Change To Daily Dosage Administered Mid Treatment?: No
Fraction Number: 7
Treatment Documentation: Physician Orders: Radiotherapy Treatment Plan: Superficial Radiotherapy with Ultrasound\\nPlan: This patient has been treated today with image-guided superficial radiation therapy for non-melanoma skin cancer.  Written informed consent has been previously obtained from this patient for this treatment. This consent is documented in the patient's chart. The patient gave verbal consent to continue treatment today.\\nThe patient was treated with a specific radiation dose and setup as prescribed by the provider listed on this visit note. A Radiation Therapist performed administration of radiation under the supervision of a provider.\\nThe treatment parameters and cumulative dose are indicated above. Prior to administering the radiation, the patient underwent a verification therapeutic radiology simulation-aided field setting defining relevant normal and abnormal target anatomy and acquiring images with a separate and distinct diagnostic high-frequency ultrasound to delineate tissues and determine whether to proceed with delivery of therapeutic, in addition to retrieve data necessary to develop an optimal radiation treatment process for the patient. The field placement simulation documents any change seen in overall tumor volume documented in the patient’s record, allows the clinician to indicate any needed changes in the treatment plan and/or prescription, provides diagnostic evaluation as the basis for performing the therapeutic procedure, and clearly identifies the information needed to decide to proceed with the therapeutic procedure. This process includes verification of the treatment port(s) and proper treatment positioning. All treatment ports were photographed within electronic medical records. The patient's lead blocking along with gross tumor volume and margin was confirmed. Considering superficial radiotherapy is clinical in setup, this requires the physician and radiation therapist to clarify the location interest being treated against initial images, ultrasound, pathology, and patient anatomy. Care was taken to ensure torres treated were geometrically accurate and properly positioned using therapeutic radiology simulation-aided field setting verification per fraction. This process is also utilized to determine if any prescription or setup changes are necessary. These steps are therefore medically necessary to ensure safe and effective administration of radiation. Ongoing therapeutic radiology simulation-aided field setting verification is ordered throughout the course of therapy.  A high-frequency ultrasound image was acquired today for a two-dimensional evaluation of the tumor volume, depth, width, breadth, review of prior response to treatment, provide geometric accuracy of field placement, and determine whether to proceed with therapeutic delivery.\\nThe field placement and ultrasound imaging, per fraction, is separate and distinct from the initial simulation and is an important task in providing safe administration of superficial radiation therapy. Physician evaluation of the ultrasound information will be ongoing throughout the course of treatment and is deemed medically necessary to ensure the efficacy of treatment, whether to proceed with therapeutic delivery, and determine any necessary changes. Today's images were evaluated for determination of continuation of treatment with the current plan or with necessary changes as appropriate. According to the review of verification therapeutic radiology simulation-aided field setting and imaging, no change is required.\\nAdditionally, the use of ultrasound visualization and targeted assessment allows the patient to be able to see the cancer progress, encouraging the patient to complete and maintain compliance through the full course of prescribed radiotherapy. Per Dr. Rhina Owusu, continued ultrasound guidance and therapeutic radiology simulation-aided field setting verification per fraction is required for field placement, measurement of tumor depth, tissues evaluation, progress, acute effect monitoring, and determination for therapeutic treatment delivery is appropriate.
Additional Comments (Add Customization Of Note Here): Patient had no issues to report.
Energy (Kv): 100
Daily Dosage (Cgy): 275.89
Total Cumulative Dose (Cgy): 1931.23
Calculate Total Cumulative Dose Automatically Or Manually: Manually
Add X Modifier?: RYAN - Unusual Non-Overlapping Service
Ultrasound Used Text: High frequency ultrasound depth is 0.8 mm, which is 0.07 mm in difference from previous imaging.

## 2023-07-25 ENCOUNTER — APPOINTMENT (OUTPATIENT)
Dept: URBAN - METROPOLITAN AREA CLINIC 250 | Age: 88
Setting detail: DERMATOLOGY
End: 2023-07-26

## 2023-07-25 PROBLEM — C44.311 BASAL CELL CARCINOMA OF SKIN OF NOSE: Status: ACTIVE | Noted: 2023-07-25

## 2023-07-25 PROCEDURE — OTHER IMAGE GUIDED - SUPERFICIAL RADIOTHERAPY: TREATMENT VISIT: OTHER

## 2023-07-25 PROCEDURE — G6001 ECHO GUIDANCE RADIOTHERAPY: HCPCS | Mod: XU

## 2023-07-25 PROCEDURE — 77401 RADIATION TX DELIVERY SUPFC: CPT

## 2023-07-25 PROCEDURE — OTHER IMAGE GUIDED - SUPERFICIAL RADIOTHERAPY: OTHER

## 2023-07-25 PROCEDURE — 77280 THER RAD SIMULAJ FIELD SMPL: CPT

## 2023-07-25 NOTE — PROCEDURE: IMAGE GUIDED - SUPERFICIAL RADIOTHERAPY: TREATMENT VISIT
Ultrasound Not Used Text: Ultrasound was not performed today due to
Prescription Used: 1
Bill For Treatment ([id number])?: Yes
Additional Change To Daily Dosage Administered Mid Treatment?: No
Fraction Number: 8
Treatment Documentation: Physician Orders: Radiotherapy Treatment Plan: Superficial Radiotherapy with Ultrasound\\nPlan: This patient has been treated today with image-guided superficial radiation therapy for non-melanoma skin cancer.  Written informed consent has been previously obtained from this patient for this treatment. This consent is documented in the patient's chart. The patient gave verbal consent to continue treatment today.\\nThe patient was treated with a specific radiation dose and setup as prescribed by the provider listed on this visit note. A Radiation Therapist performed administration of radiation under the supervision of a provider.\\nThe treatment parameters and cumulative dose are indicated above. Prior to administering the radiation, the patient underwent a verification therapeutic radiology simulation-aided field setting defining relevant normal and abnormal target anatomy and acquiring images with a separate and distinct diagnostic high-frequency ultrasound to delineate tissues and determine whether to proceed with delivery of therapeutic, in addition to retrieve data necessary to develop an optimal radiation treatment process for the patient. The field placement simulation documents any change seen in overall tumor volume documented in the patient’s record, allows the clinician to indicate any needed changes in the treatment plan and/or prescription, provides diagnostic evaluation as the basis for performing the therapeutic procedure, and clearly identifies the information needed to decide to proceed with the therapeutic procedure. This process includes verification of the treatment port(s) and proper treatment positioning. All treatment ports were photographed within electronic medical records. The patient's lead blocking along with gross tumor volume and margin was confirmed. Considering superficial radiotherapy is clinical in setup, this requires the physician and radiation therapist to clarify the location interest being treated against initial images, ultrasound, pathology, and patient anatomy. Care was taken to ensure torres treated were geometrically accurate and properly positioned using therapeutic radiology simulation-aided field setting verification per fraction. This process is also utilized to determine if any prescription or setup changes are necessary. These steps are therefore medically necessary to ensure safe and effective administration of radiation. Ongoing therapeutic radiology simulation-aided field setting verification is ordered throughout the course of therapy.  A high-frequency ultrasound image was acquired today for a two-dimensional evaluation of the tumor volume, depth, width, breadth, review of prior response to treatment, provide geometric accuracy of field placement, and determine whether to proceed with therapeutic delivery.\\nThe field placement and ultrasound imaging, per fraction, is separate and distinct from the initial simulation and is an important task in providing safe administration of superficial radiation therapy. Physician evaluation of the ultrasound information will be ongoing throughout the course of treatment and is deemed medically necessary to ensure the efficacy of treatment, whether to proceed with therapeutic delivery, and determine any necessary changes. Today's images were evaluated for determination of continuation of treatment with the current plan or with necessary changes as appropriate. According to the review of verification therapeutic radiology simulation-aided field setting and imaging, no change is required.\\nAdditionally, the use of ultrasound visualization and targeted assessment allows the patient to be able to see the cancer progress, encouraging the patient to complete and maintain compliance through the full course of prescribed radiotherapy. Per Dr. Rhina Owusu, continued ultrasound guidance and therapeutic radiology simulation-aided field setting verification per fraction is required for field placement, measurement of tumor depth, tissues evaluation, progress, acute effect monitoring, and determination for therapeutic treatment delivery is appropriate.
Additional Comments (Add Customization Of Note Here): Patient had no issues to report.
Energy (Kv): 100
Daily Dosage (Cgy): 275.89
Total Cumulative Dose (Cgy): 2207.12
Calculate Total Cumulative Dose Automatically Or Manually: Manually
Add X Modifier?: RYAN - Unusual Non-Overlapping Service
Ultrasound Used Text: High frequency ultrasound depth is 0.9 mm, which is 0.1 mm in difference from previous imaging.

## 2023-07-26 ENCOUNTER — APPOINTMENT (OUTPATIENT)
Dept: URBAN - METROPOLITAN AREA CLINIC 250 | Age: 88
Setting detail: DERMATOLOGY
End: 2023-08-01

## 2023-07-26 PROBLEM — C44.311 BASAL CELL CARCINOMA OF SKIN OF NOSE: Status: ACTIVE | Noted: 2023-07-26

## 2023-07-26 PROCEDURE — 77401 RADIATION TX DELIVERY SUPFC: CPT

## 2023-07-26 PROCEDURE — G6001 ECHO GUIDANCE RADIOTHERAPY: HCPCS | Mod: XU

## 2023-07-26 PROCEDURE — 77280 THER RAD SIMULAJ FIELD SMPL: CPT

## 2023-07-26 PROCEDURE — OTHER IMAGE GUIDED - SUPERFICIAL RADIOTHERAPY: OTHER

## 2023-07-26 PROCEDURE — OTHER IMAGE GUIDED - SUPERFICIAL RADIOTHERAPY: TREATMENT VISIT: OTHER

## 2023-07-26 NOTE — PROCEDURE: IMAGE GUIDED - SUPERFICIAL RADIOTHERAPY: TREATMENT VISIT
Ultrasound Not Used Text: Ultrasound was not performed today due to
Prescription Used: 1
Bill For Treatment (48572)?: Yes
Additional Change To Daily Dosage Administered Mid Treatment?: No
Fraction Number: 9
Treatment Documentation: Physician Orders: Radiotherapy Treatment Plan: Superficial Radiotherapy with Ultrasound\\nPlan: This patient has been treated today with image-guided superficial radiation therapy for non-melanoma skin cancer.  Written informed consent has been previously obtained from this patient for this treatment. This consent is documented in the patient's chart. The patient gave verbal consent to continue treatment today.\\nThe patient was treated with a specific radiation dose and setup as prescribed by the provider listed on this visit note. A Radiation Therapist performed administration of radiation under the supervision of a provider.\\nThe treatment parameters and cumulative dose are indicated above. Prior to administering the radiation, the patient underwent a verification therapeutic radiology simulation-aided field setting defining relevant normal and abnormal target anatomy and acquiring images with a separate and distinct diagnostic high-frequency ultrasound to delineate tissues and determine whether to proceed with delivery of therapeutic, in addition to retrieve data necessary to develop an optimal radiation treatment process for the patient. The field placement simulation documents any change seen in overall tumor volume documented in the patient’s record, allows the clinician to indicate any needed changes in the treatment plan and/or prescription, provides diagnostic evaluation as the basis for performing the therapeutic procedure, and clearly identifies the information needed to decide to proceed with the therapeutic procedure. This process includes verification of the treatment port(s) and proper treatment positioning. All treatment ports were photographed within electronic medical records. The patient's lead blocking along with gross tumor volume and margin was confirmed. Considering superficial radiotherapy is clinical in setup, this requires the physician and radiation therapist to clarify the location interest being treated against initial images, ultrasound, pathology, and patient anatomy. Care was taken to ensure torres treated were geometrically accurate and properly positioned using therapeutic radiology simulation-aided field setting verification per fraction. This process is also utilized to determine if any prescription or setup changes are necessary. These steps are therefore medically necessary to ensure safe and effective administration of radiation. Ongoing therapeutic radiology simulation-aided field setting verification is ordered throughout the course of therapy.  A high-frequency ultrasound image was acquired today for a two-dimensional evaluation of the tumor volume, depth, width, breadth, review of prior response to treatment, provide geometric accuracy of field placement, and determine whether to proceed with therapeutic delivery.\\nThe field placement and ultrasound imaging, per fraction, is separate and distinct from the initial simulation and is an important task in providing safe administration of superficial radiation therapy. Physician evaluation of the ultrasound information will be ongoing throughout the course of treatment and is deemed medically necessary to ensure the efficacy of treatment, whether to proceed with therapeutic delivery, and determine any necessary changes. Today's images were evaluated for determination of continuation of treatment with the current plan or with necessary changes as appropriate. According to the review of verification therapeutic radiology simulation-aided field setting and imaging, no change is required.\\nAdditionally, the use of ultrasound visualization and targeted assessment allows the patient to be able to see the cancer progress, encouraging the patient to complete and maintain compliance through the full course of prescribed radiotherapy. Per Dr. Rhina Owusu, continued ultrasound guidance and therapeutic radiology simulation-aided field setting verification per fraction is required for field placement, measurement of tumor depth, tissues evaluation, progress, acute effect monitoring, and determination for therapeutic treatment delivery is appropriate.
Additional Comments (Add Customization Of Note Here): Patient had no issues to report.
Energy (Kv): 100
Daily Dosage (Cgy): 275.89
Total Cumulative Dose (Cgy): 2483.01
Calculate Total Cumulative Dose Automatically Or Manually: Manually
Add X Modifier?: RYAN - Unusual Non-Overlapping Service
Ultrasound Used Text: High frequency ultrasound depth is 0.86 mm, which is 0.4 mm in difference from previous imaging.

## 2023-07-31 ENCOUNTER — APPOINTMENT (OUTPATIENT)
Dept: URBAN - METROPOLITAN AREA CLINIC 250 | Age: 88
Setting detail: DERMATOLOGY
End: 2023-07-31

## 2023-07-31 PROBLEM — C44.311 BASAL CELL CARCINOMA OF SKIN OF NOSE: Status: ACTIVE | Noted: 2023-07-31

## 2023-07-31 PROCEDURE — 77401 RADIATION TX DELIVERY SUPFC: CPT

## 2023-07-31 PROCEDURE — 77280 THER RAD SIMULAJ FIELD SMPL: CPT

## 2023-07-31 PROCEDURE — OTHER IMAGE GUIDED - SUPERFICIAL RADIOTHERAPY: TREATMENT VISIT: OTHER

## 2023-07-31 PROCEDURE — G6001 ECHO GUIDANCE RADIOTHERAPY: HCPCS | Mod: XU

## 2023-07-31 PROCEDURE — OTHER IMAGE GUIDED - SUPERFICIAL RADIOTHERAPY: OTHER

## 2023-07-31 NOTE — PROCEDURE: IMAGE GUIDED - SUPERFICIAL RADIOTHERAPY: TREATMENT VISIT
Ultrasound Not Used Text: Ultrasound was not performed today due to
Prescription Used: 1
Bill For Treatment ([id number])?: Yes
Additional Change To Daily Dosage Administered Mid Treatment?: No
Fraction Number: 10
Treatment Documentation: Physician Orders: Radiotherapy Treatment Plan: Superficial Radiotherapy with Ultrasound\\nPlan: This patient has been treated today with image-guided superficial radiation therapy for non-melanoma skin cancer.  Written informed consent has been previously obtained from this patient for this treatment. This consent is documented in the patient's chart. The patient gave verbal consent to continue treatment today.\\nThe patient was treated with a specific radiation dose and setup as prescribed by the provider listed on this visit note. A Radiation Therapist performed administration of radiation under the supervision of a provider.\\nThe treatment parameters and cumulative dose are indicated above. Prior to administering the radiation, the patient underwent a verification therapeutic radiology simulation-aided field setting defining relevant normal and abnormal target anatomy and acquiring images with a separate and distinct diagnostic high-frequency ultrasound to delineate tissues and determine whether to proceed with delivery of therapeutic, in addition to retrieve data necessary to develop an optimal radiation treatment process for the patient. The field placement simulation documents any change seen in overall tumor volume documented in the patient’s record, allows the clinician to indicate any needed changes in the treatment plan and/or prescription, provides diagnostic evaluation as the basis for performing the therapeutic procedure, and clearly identifies the information needed to decide to proceed with the therapeutic procedure. This process includes verification of the treatment port(s) and proper treatment positioning. All treatment ports were photographed within electronic medical records. The patient's lead blocking along with gross tumor volume and margin was confirmed. Considering superficial radiotherapy is clinical in setup, this requires the physician and radiation therapist to clarify the location interest being treated against initial images, ultrasound, pathology, and patient anatomy. Care was taken to ensure torres treated were geometrically accurate and properly positioned using therapeutic radiology simulation-aided field setting verification per fraction. This process is also utilized to determine if any prescription or setup changes are necessary. These steps are therefore medically necessary to ensure safe and effective administration of radiation. Ongoing therapeutic radiology simulation-aided field setting verification is ordered throughout the course of therapy.  A high-frequency ultrasound image was acquired today for a two-dimensional evaluation of the tumor volume, depth, width, breadth, review of prior response to treatment, provide geometric accuracy of field placement, and determine whether to proceed with therapeutic delivery.\\nThe field placement and ultrasound imaging, per fraction, is separate and distinct from the initial simulation and is an important task in providing safe administration of superficial radiation therapy. Physician evaluation of the ultrasound information will be ongoing throughout the course of treatment and is deemed medically necessary to ensure the efficacy of treatment, whether to proceed with therapeutic delivery, and determine any necessary changes. Today's images were evaluated for determination of continuation of treatment with the current plan or with necessary changes as appropriate. According to the review of verification therapeutic radiology simulation-aided field setting and imaging, no change is required.\\nAdditionally, the use of ultrasound visualization and targeted assessment allows the patient to be able to see the cancer progress, encouraging the patient to complete and maintain compliance through the full course of prescribed radiotherapy. Per Dr. Rhina Owusu, continued ultrasound guidance and therapeutic radiology simulation-aided field setting verification per fraction is required for field placement, measurement of tumor depth, tissues evaluation, progress, acute effect monitoring, and determination for therapeutic treatment delivery is appropriate.
Additional Comments (Add Customization Of Note Here): Patient is showing erythema.
Energy (Kv): 100
Daily Dosage (Cgy): 275.89
Total Cumulative Dose (Cgy): 2758.9
Calculate Total Cumulative Dose Automatically Or Manually: Manually
Add X Modifier?: RYAN - Unusual Non-Overlapping Service
Ultrasound Used Text: High frequency ultrasound depth is 0.96 mm, which is 0.1 mm in difference from previous imaging.

## 2023-08-01 ENCOUNTER — APPOINTMENT (OUTPATIENT)
Dept: URBAN - METROPOLITAN AREA CLINIC 250 | Age: 88
Setting detail: DERMATOLOGY
End: 2023-08-01

## 2023-08-01 PROBLEM — C44.311 BASAL CELL CARCINOMA OF SKIN OF NOSE: Status: ACTIVE | Noted: 2023-08-01

## 2023-08-01 PROCEDURE — OTHER IMAGE GUIDED - SUPERFICIAL RADIOTHERAPY: TREATMENT VISIT: OTHER

## 2023-08-01 PROCEDURE — G6001 ECHO GUIDANCE RADIOTHERAPY: HCPCS | Mod: XU

## 2023-08-01 PROCEDURE — OTHER IMAGE GUIDED - SUPERFICIAL RADIOTHERAPY: EVALUATION VISIT: OTHER

## 2023-08-01 PROCEDURE — 77427 RADIATION TX MANAGEMENT X5: CPT

## 2023-08-01 PROCEDURE — 77280 THER RAD SIMULAJ FIELD SMPL: CPT

## 2023-08-01 PROCEDURE — OTHER IMAGE GUIDED - SUPERFICIAL RADIOTHERAPY: OTHER

## 2023-08-01 PROCEDURE — 77401 RADIATION TX DELIVERY SUPFC: CPT

## 2023-08-01 NOTE — PROCEDURE: IMAGE GUIDED - SUPERFICIAL RADIOTHERAPY: TREATMENT VISIT
Ultrasound Not Used Text: Ultrasound was not performed today due to
Prescription Used: 1
Bill For Treatment ([id number])?: Yes
Additional Change To Daily Dosage Administered Mid Treatment?: No
Fraction Number: 11
Treatment Documentation: Physician Orders: Radiotherapy Treatment Plan: Superficial Radiotherapy with Ultrasound\\nPlan: This patient has been treated today with image-guided superficial radiation therapy for non-melanoma skin cancer.  Written informed consent has been previously obtained from this patient for this treatment. This consent is documented in the patient's chart. The patient gave verbal consent to continue treatment today.\\nThe patient was treated with a specific radiation dose and setup as prescribed by the provider listed on this visit note. A Radiation Therapist performed administration of radiation under the supervision of a provider.\\nThe treatment parameters and cumulative dose are indicated above. Prior to administering the radiation, the patient underwent a verification therapeutic radiology simulation-aided field setting defining relevant normal and abnormal target anatomy and acquiring images with a separate and distinct diagnostic high-frequency ultrasound to delineate tissues and determine whether to proceed with delivery of therapeutic, in addition to retrieve data necessary to develop an optimal radiation treatment process for the patient. The field placement simulation documents any change seen in overall tumor volume documented in the patient’s record, allows the clinician to indicate any needed changes in the treatment plan and/or prescription, provides diagnostic evaluation as the basis for performing the therapeutic procedure, and clearly identifies the information needed to decide to proceed with the therapeutic procedure. This process includes verification of the treatment port(s) and proper treatment positioning. All treatment ports were photographed within electronic medical records. The patient's lead blocking along with gross tumor volume and margin was confirmed. Considering superficial radiotherapy is clinical in setup, this requires the physician and radiation therapist to clarify the location interest being treated against initial images, ultrasound, pathology, and patient anatomy. Care was taken to ensure torres treated were geometrically accurate and properly positioned using therapeutic radiology simulation-aided field setting verification per fraction. This process is also utilized to determine if any prescription or setup changes are necessary. These steps are therefore medically necessary to ensure safe and effective administration of radiation. Ongoing therapeutic radiology simulation-aided field setting verification is ordered throughout the course of therapy.  A high-frequency ultrasound image was acquired today for a two-dimensional evaluation of the tumor volume, depth, width, breadth, review of prior response to treatment, provide geometric accuracy of field placement, and determine whether to proceed with therapeutic delivery.\\nThe field placement and ultrasound imaging, per fraction, is separate and distinct from the initial simulation and is an important task in providing safe administration of superficial radiation therapy. Physician evaluation of the ultrasound information will be ongoing throughout the course of treatment and is deemed medically necessary to ensure the efficacy of treatment, whether to proceed with therapeutic delivery, and determine any necessary changes. Today's images were evaluated for determination of continuation of treatment with the current plan or with necessary changes as appropriate. According to the review of verification therapeutic radiology simulation-aided field setting and imaging, no change is required.\\nAdditionally, the use of ultrasound visualization and targeted assessment allows the patient to be able to see the cancer progress, encouraging the patient to complete and maintain compliance through the full course of prescribed radiotherapy. Per Dr. Rhina Owusu, continued ultrasound guidance and therapeutic radiology simulation-aided field setting verification per fraction is required for field placement, measurement of tumor depth, tissues evaluation, progress, acute effect monitoring, and determination for therapeutic treatment delivery is appropriate.
Additional Comments (Add Customization Of Note Here): Patient is showing erythema.
Energy (Kv): 100
Daily Dosage (Cgy): 275.89
Total Cumulative Dose (Cgy): 3034.79
Calculate Total Cumulative Dose Automatically Or Manually: Manually
Add X Modifier?: RYAN - Unusual Non-Overlapping Service
Ultrasound Used Text: High frequency ultrasound depth is 0.88 mm, which is 0.08 mm in difference from previous imaging.

## 2023-08-02 ENCOUNTER — APPOINTMENT (OUTPATIENT)
Dept: URBAN - METROPOLITAN AREA CLINIC 250 | Age: 88
Setting detail: DERMATOLOGY
End: 2023-08-06

## 2023-08-02 PROBLEM — C44.311 BASAL CELL CARCINOMA OF SKIN OF NOSE: Status: ACTIVE | Noted: 2023-08-02

## 2023-08-02 PROCEDURE — 77401 RADIATION TX DELIVERY SUPFC: CPT

## 2023-08-02 PROCEDURE — 77280 THER RAD SIMULAJ FIELD SMPL: CPT

## 2023-08-02 PROCEDURE — OTHER IMAGE GUIDED - SUPERFICIAL RADIOTHERAPY: TREATMENT VISIT: OTHER

## 2023-08-02 PROCEDURE — OTHER IMAGE GUIDED - SUPERFICIAL RADIOTHERAPY: OTHER

## 2023-08-02 PROCEDURE — G6001 ECHO GUIDANCE RADIOTHERAPY: HCPCS | Mod: XU

## 2023-08-08 ENCOUNTER — APPOINTMENT (OUTPATIENT)
Dept: URBAN - METROPOLITAN AREA CLINIC 250 | Age: 88
Setting detail: DERMATOLOGY
End: 2023-08-08

## 2023-08-08 PROBLEM — C44.311 BASAL CELL CARCINOMA OF SKIN OF NOSE: Status: ACTIVE | Noted: 2023-08-08

## 2023-08-08 PROCEDURE — 77280 THER RAD SIMULAJ FIELD SMPL: CPT

## 2023-08-08 PROCEDURE — G6001 ECHO GUIDANCE RADIOTHERAPY: HCPCS | Mod: XU

## 2023-08-08 PROCEDURE — OTHER IMAGE GUIDED - SUPERFICIAL RADIOTHERAPY: TREATMENT VISIT: OTHER

## 2023-08-08 PROCEDURE — 77401 RADIATION TX DELIVERY SUPFC: CPT

## 2023-08-08 PROCEDURE — OTHER IMAGE GUIDED - SUPERFICIAL RADIOTHERAPY: OTHER

## 2023-08-08 NOTE — PROCEDURE: IMAGE GUIDED - SUPERFICIAL RADIOTHERAPY: TREATMENT VISIT
Ultrasound Not Used Text: Ultrasound was not performed today due to
Prescription Used: 1
Bill For Treatment (26776)?: Yes
Additional Change To Daily Dosage Administered Mid Treatment?: No
Fraction Number: 13
Treatment Documentation: Physician Orders: Radiotherapy Treatment Plan: Superficial Radiotherapy with Ultrasound\\nPlan: This patient has been treated today with image-guided superficial radiation therapy for non-melanoma skin cancer.  Written informed consent has been previously obtained from this patient for this treatment. This consent is documented in the patient's chart. The patient gave verbal consent to continue treatment today.\\nThe patient was treated with a specific radiation dose and setup as prescribed by the provider listed on this visit note. A Radiation Therapist performed administration of radiation under the supervision of a provider.\\nThe treatment parameters and cumulative dose are indicated above. Prior to administering the radiation, the patient underwent a verification therapeutic radiology simulation-aided field setting defining relevant normal and abnormal target anatomy and acquiring images with a separate and distinct diagnostic high-frequency ultrasound to delineate tissues and determine whether to proceed with delivery of therapeutic, in addition to retrieve data necessary to develop an optimal radiation treatment process for the patient. The field placement simulation documents any change seen in overall tumor volume documented in the patient’s record, allows the clinician to indicate any needed changes in the treatment plan and/or prescription, provides diagnostic evaluation as the basis for performing the therapeutic procedure, and clearly identifies the information needed to decide to proceed with the therapeutic procedure. This process includes verification of the treatment port(s) and proper treatment positioning. All treatment ports were photographed within electronic medical records. The patient's lead blocking along with gross tumor volume and margin was confirmed. Considering superficial radiotherapy is clinical in setup, this requires the physician and radiation therapist to clarify the location interest being treated against initial images, ultrasound, pathology, and patient anatomy. Care was taken to ensure torres treated were geometrically accurate and properly positioned using therapeutic radiology simulation-aided field setting verification per fraction. This process is also utilized to determine if any prescription or setup changes are necessary. These steps are therefore medically necessary to ensure safe and effective administration of radiation. Ongoing therapeutic radiology simulation-aided field setting verification is ordered throughout the course of therapy.  A high-frequency ultrasound image was acquired today for a two-dimensional evaluation of the tumor volume, depth, width, breadth, review of prior response to treatment, provide geometric accuracy of field placement, and determine whether to proceed with therapeutic delivery.\\nThe field placement and ultrasound imaging, per fraction, is separate and distinct from the initial simulation and is an important task in providing safe administration of superficial radiation therapy. Physician evaluation of the ultrasound information will be ongoing throughout the course of treatment and is deemed medically necessary to ensure the efficacy of treatment, whether to proceed with therapeutic delivery, and determine any necessary changes. Today's images were evaluated for determination of continuation of treatment with the current plan or with necessary changes as appropriate. According to the review of verification therapeutic radiology simulation-aided field setting and imaging, no change is required.\\nAdditionally, the use of ultrasound visualization and targeted assessment allows the patient to be able to see the cancer progress, encouraging the patient to complete and maintain compliance through the full course of prescribed radiotherapy. Per Dr. Rhina Owusu, continued ultrasound guidance and therapeutic radiology simulation-aided field setting verification per fraction is required for field placement, measurement of tumor depth, tissues evaluation, progress, acute effect monitoring, and determination for therapeutic treatment delivery is appropriate.
Additional Comments (Add Customization Of Note Here): Patient is using cream daily as needed.
Energy (Kv): 100
Daily Dosage (Cgy): 275.89
Total Cumulative Dose (Cgy): 3586.57
Calculate Total Cumulative Dose Automatically Or Manually: Manually
Add X Modifier?: RYAN - Unusual Non-Overlapping Service
Ultrasound Used Text: High frequency ultrasound depth is 0.78 mm, which is 0.07 mm in difference from previous imaging.

## 2023-08-09 ENCOUNTER — APPOINTMENT (OUTPATIENT)
Dept: URBAN - METROPOLITAN AREA CLINIC 250 | Age: 88
Setting detail: DERMATOLOGY
End: 2023-08-09

## 2023-08-09 PROBLEM — C44.311 BASAL CELL CARCINOMA OF SKIN OF NOSE: Status: ACTIVE | Noted: 2023-08-09

## 2023-08-09 PROCEDURE — OTHER IMAGE GUIDED - SUPERFICIAL RADIOTHERAPY: TREATMENT VISIT: OTHER

## 2023-08-09 PROCEDURE — 77280 THER RAD SIMULAJ FIELD SMPL: CPT

## 2023-08-09 PROCEDURE — G6001 ECHO GUIDANCE RADIOTHERAPY: HCPCS | Mod: XU

## 2023-08-09 PROCEDURE — OTHER IMAGE GUIDED - SUPERFICIAL RADIOTHERAPY: OTHER

## 2023-08-09 PROCEDURE — 77401 RADIATION TX DELIVERY SUPFC: CPT

## 2023-08-09 NOTE — PROCEDURE: IMAGE GUIDED - SUPERFICIAL RADIOTHERAPY: TREATMENT VISIT
Ultrasound Not Used Text: Ultrasound was not performed today due to
Prescription Used: 1
Bill For Treatment (93775)?: Yes
Additional Change To Daily Dosage Administered Mid Treatment?: No
Fraction Number: 14
Treatment Documentation: Physician Orders: Radiotherapy Treatment Plan: Superficial Radiotherapy with Ultrasound\\nPlan: This patient has been treated today with image-guided superficial radiation therapy for non-melanoma skin cancer.  Written informed consent has been previously obtained from this patient for this treatment. This consent is documented in the patient's chart. The patient gave verbal consent to continue treatment today.\\nThe patient was treated with a specific radiation dose and setup as prescribed by the provider listed on this visit note. A Radiation Therapist performed administration of radiation under the supervision of a provider.\\nThe treatment parameters and cumulative dose are indicated above. Prior to administering the radiation, the patient underwent a verification therapeutic radiology simulation-aided field setting defining relevant normal and abnormal target anatomy and acquiring images with a separate and distinct diagnostic high-frequency ultrasound to delineate tissues and determine whether to proceed with delivery of therapeutic, in addition to retrieve data necessary to develop an optimal radiation treatment process for the patient. The field placement simulation documents any change seen in overall tumor volume documented in the patient’s record, allows the clinician to indicate any needed changes in the treatment plan and/or prescription, provides diagnostic evaluation as the basis for performing the therapeutic procedure, and clearly identifies the information needed to decide to proceed with the therapeutic procedure. This process includes verification of the treatment port(s) and proper treatment positioning. All treatment ports were photographed within electronic medical records. The patient's lead blocking along with gross tumor volume and margin was confirmed. Considering superficial radiotherapy is clinical in setup, this requires the physician and radiation therapist to clarify the location interest being treated against initial images, ultrasound, pathology, and patient anatomy. Care was taken to ensure torres treated were geometrically accurate and properly positioned using therapeutic radiology simulation-aided field setting verification per fraction. This process is also utilized to determine if any prescription or setup changes are necessary. These steps are therefore medically necessary to ensure safe and effective administration of radiation. Ongoing therapeutic radiology simulation-aided field setting verification is ordered throughout the course of therapy.  A high-frequency ultrasound image was acquired today for a two-dimensional evaluation of the tumor volume, depth, width, breadth, review of prior response to treatment, provide geometric accuracy of field placement, and determine whether to proceed with therapeutic delivery.\\nThe field placement and ultrasound imaging, per fraction, is separate and distinct from the initial simulation and is an important task in providing safe administration of superficial radiation therapy. Physician evaluation of the ultrasound information will be ongoing throughout the course of treatment and is deemed medically necessary to ensure the efficacy of treatment, whether to proceed with therapeutic delivery, and determine any necessary changes. Today's images were evaluated for determination of continuation of treatment with the current plan or with necessary changes as appropriate. According to the review of verification therapeutic radiology simulation-aided field setting and imaging, no change is required.\\nAdditionally, the use of ultrasound visualization and targeted assessment allows the patient to be able to see the cancer progress, encouraging the patient to complete and maintain compliance through the full course of prescribed radiotherapy. Per Dr. Rhina Owusu, continued ultrasound guidance and therapeutic radiology simulation-aided field setting verification per fraction is required for field placement, measurement of tumor depth, tissues evaluation, progress, acute effect monitoring, and determination for therapeutic treatment delivery is appropriate.
Additional Comments (Add Customization Of Note Here): Patient is using cream daily as needed.
Energy (Kv): 100
Daily Dosage (Cgy): 275.89
Total Cumulative Dose (Cgy): 3862.46
Calculate Total Cumulative Dose Automatically Or Manually: Manually
Add X Modifier?: RYAN - Unusual Non-Overlapping Service
Ultrasound Used Text: High frequency ultrasound depth is 0.72 mm, which is 0.06 mm in difference from previous imaging.

## 2023-08-10 ENCOUNTER — APPOINTMENT (OUTPATIENT)
Dept: URBAN - METROPOLITAN AREA CLINIC 250 | Age: 88
Setting detail: DERMATOLOGY
End: 2023-08-10

## 2023-08-10 PROBLEM — C44.311 BASAL CELL CARCINOMA OF SKIN OF NOSE: Status: ACTIVE | Noted: 2023-08-10

## 2023-08-10 PROCEDURE — G6001 ECHO GUIDANCE RADIOTHERAPY: HCPCS | Mod: XU

## 2023-08-10 PROCEDURE — 77280 THER RAD SIMULAJ FIELD SMPL: CPT

## 2023-08-10 PROCEDURE — OTHER IMAGE GUIDED - SUPERFICIAL RADIOTHERAPY: OTHER

## 2023-08-10 PROCEDURE — OTHER IMAGE GUIDED - SUPERFICIAL RADIOTHERAPY: TREATMENT VISIT: OTHER

## 2023-08-10 PROCEDURE — 77401 RADIATION TX DELIVERY SUPFC: CPT

## 2023-08-10 NOTE — PROCEDURE: IMAGE GUIDED - SUPERFICIAL RADIOTHERAPY: TREATMENT VISIT
Treatment Documentation: Physician Orders: Radiotherapy Treatment Plan: Superficial Radiotherapy with Ultrasound\\nPlan: This patient has been treated today with image-guided superficial radiation therapy for non-melanoma skin cancer.  Written informed consent has been previously obtained from this patient for this treatment. This consent is documented in the patient's chart. The patient gave verbal consent to continue treatment today.\\nThe patient was treated with a specific radiation dose and setup as prescribed by the provider listed on this visit note. A Radiation Therapist performed administration of radiation under the supervision of a provider.\\nThe treatment parameters and cumulative dose are indicated above. Prior to administering the radiation, the patient underwent a verification therapeutic radiology simulation-aided field setting defining relevant normal and abnormal target anatomy and acquiring images with a separate and distinct diagnostic high-frequency ultrasound to delineate tissues and determine whether to proceed with delivery of therapeutic, in addition to retrieve data necessary to develop an optimal radiation treatment process for the patient. The field placement simulation documents any change seen in overall tumor volume documented in the patient’s record, allows the clinician to indicate any needed changes in the treatment plan and/or prescription, provides diagnostic evaluation as the basis for performing the therapeutic procedure, and clearly identifies the information needed to decide to proceed with the therapeutic procedure. This process includes verification of the treatment port(s) and proper treatment positioning. All treatment ports were photographed within electronic medical records. The patient's lead blocking along with gross tumor volume and margin was confirmed. Considering superficial radiotherapy is clinical in setup, this requires the physician and radiation therapist to clarify the location interest being treated against initial images, ultrasound, pathology, and patient anatomy. Care was taken to ensure torrse treated were geometrically accurate and properly positioned using therapeutic radiology simulation-aided field setting verification per fraction. This process is also utilized to determine if any prescription or setup changes are necessary. These steps are therefore medically necessary to ensure safe and effective administration of radiation. Ongoing therapeutic radiology simulation-aided field setting verification is ordered throughout the course of therapy.  A high-frequency ultrasound image was acquired today for a two-dimensional evaluation of the tumor volume, depth, width, breadth, review of prior response to treatment, provide geometric accuracy of field placement, and determine whether to proceed with therapeutic delivery.\\nThe field placement and ultrasound imaging, per fraction, is separate and distinct from the initial simulation and is an important task in providing safe administration of superficial radiation therapy. Physician evaluation of the ultrasound information will be ongoing throughout the course of treatment and is deemed medically necessary to ensure the efficacy of treatment, whether to proceed with therapeutic delivery, and determine any necessary changes. Today's images were evaluated for determination of continuation of treatment with the current plan or with necessary changes as appropriate. According to the review of verification therapeutic radiology simulation-aided field setting and imaging, no change is required.\\nAdditionally, the use of ultrasound visualization and targeted assessment allows the patient to be able to see the cancer progress, encouraging the patient to complete and maintain compliance through the full course of prescribed radiotherapy. Per Dr. Rhina Owusu, continued ultrasound guidance and therapeutic radiology simulation-aided field setting verification per fraction is required for field placement, measurement of tumor depth, tissues evaluation, progress, acute effect monitoring, and determination for therapeutic treatment delivery is appropriate. Treatment Documentation: Physician Orders: Radiotherapy Treatment Plan: Superficial Radiotherapy with Ultrasound\\nPlan: This patient has been treated today with image-guided superficial radiation therapy for non-melanoma skin cancer.  Written informed consent has been previously obtained from this patient for this treatment. This consent is documented in the patient's chart. The patient gave verbal consent to continue treatment today.\\nThe patient was treated with a specific radiation dose and setup as prescribed by the provider listed on this visit note. A Radiation Therapist performed administration of radiation under the supervision of a provider.\\nThe treatment parameters and cumulative dose are indicated above. Prior to administering the radiation, the patient underwent a verification therapeutic radiology simulation-aided field setting defining relevant normal and abnormal target anatomy and acquiring images with a separate and distinct diagnostic high-frequency ultrasound to delineate tissues and determine whether to proceed with delivery of therapeutic, in addition to retrieve data necessary to develop an optimal radiation treatment process for the patient. The field placement simulation documents any change seen in overall tumor volume documented in the patient’s record, allows the clinician to indicate any needed changes in the treatment plan and/or prescription, provides diagnostic evaluation as the basis for performing the therapeutic procedure, and clearly identifies the information needed to decide to proceed with the therapeutic procedure. This process includes verification of the treatment port(s) and proper treatment positioning. All treatment ports were photographed within electronic medical records. The patient's lead blocking along with gross tumor volume and margin was confirmed. Considering superficial radiotherapy is clinical in setup, this requires the physician and radiation therapist to clarify the location interest being treated against initial images, ultrasound, pathology, and patient anatomy. Care was taken to ensure torres treated were geometrically accurate and properly positioned using therapeutic radiology simulation-aided field setting verification per fraction. This process is also utilized to determine if any prescription or setup changes are necessary. These steps are therefore medically necessary to ensure safe and effective administration of radiation. Ongoing therapeutic radiology simulation-aided field setting verification is ordered throughout the course of therapy.  A high-frequency ultrasound image was acquired today for a two-dimensional evaluation of the tumor volume, depth, width, breadth, review of prior response to treatment, provide geometric accuracy of field placement, and determine whether to proceed with therapeutic delivery.\\nThe field placement and ultrasound imaging, per fraction, is separate and distinct from the initial simulation and is an important task in providing safe administration of superficial radiation therapy. Physician evaluation of the ultrasound information will be ongoing throughout the course of treatment and is deemed medically necessary to ensure the efficacy of treatment, whether to proceed with therapeutic delivery, and determine any necessary changes. Today's images were evaluated for determination of continuation of treatment with the current plan or with necessary changes as appropriate. According to the review of verification therapeutic radiology simulation-aided field setting and imaging, no change is required.\\nAdditionally, the use of ultrasound visualization and targeted assessment allows the patient to be able to see the cancer progress, encouraging the patient to complete and maintain compliance through the full course of prescribed radiotherapy. Per Dr. Rhina Owusu, continued ultrasound guidance and therapeutic radiology simulation-aided field setting verification per fraction is required for field placement, measurement of tumor depth, tissues evaluation, progress, acute effect monitoring, and determination for therapeutic treatment delivery is appropriate.

## 2023-08-15 ENCOUNTER — APPOINTMENT (OUTPATIENT)
Dept: URBAN - METROPOLITAN AREA CLINIC 250 | Age: 88
Setting detail: DERMATOLOGY
End: 2023-08-24

## 2023-08-15 PROBLEM — C44.311 BASAL CELL CARCINOMA OF SKIN OF NOSE: Status: ACTIVE | Noted: 2023-08-15

## 2023-08-15 PROCEDURE — 77401 RADIATION TX DELIVERY SUPFC: CPT

## 2023-08-15 PROCEDURE — 77280 THER RAD SIMULAJ FIELD SMPL: CPT

## 2023-08-15 PROCEDURE — OTHER IMAGE GUIDED - SUPERFICIAL RADIOTHERAPY: EVALUATION VISIT: OTHER

## 2023-08-15 PROCEDURE — OTHER IMAGE GUIDED - SUPERFICIAL RADIOTHERAPY: TREATMENT VISIT: OTHER

## 2023-08-15 PROCEDURE — G6001 ECHO GUIDANCE RADIOTHERAPY: HCPCS | Mod: XU

## 2023-08-15 PROCEDURE — OTHER IMAGE GUIDED - SUPERFICIAL RADIOTHERAPY: OTHER

## 2023-08-15 PROCEDURE — 77427 RADIATION TX MANAGEMENT X5: CPT

## 2023-08-15 NOTE — PROCEDURE: IMAGE GUIDED - SUPERFICIAL RADIOTHERAPY: TREATMENT VISIT
Ultrasound Not Used Text: Ultrasound was not performed today due to
Prescription Used: 1
Bill For Treatment ([id number])?: Yes
Additional Change To Daily Dosage Administered Mid Treatment?: No
Fraction Number: 16
Treatment Documentation: Physician Orders: Radiotherapy Treatment Plan: Superficial Radiotherapy with Ultrasound\\nPlan: This patient has been treated today with image-guided superficial radiation therapy for non-melanoma skin cancer.  Written informed consent has been previously obtained from this patient for this treatment. This consent is documented in the patient's chart. The patient gave verbal consent to continue treatment today.\\nThe patient was treated with a specific radiation dose and setup as prescribed by the provider listed on this visit note. A Radiation Therapist performed administration of radiation under the supervision of a provider.\\nThe treatment parameters and cumulative dose are indicated above. Prior to administering the radiation, the patient underwent a verification therapeutic radiology simulation-aided field setting defining relevant normal and abnormal target anatomy and acquiring images with a separate and distinct diagnostic high-frequency ultrasound to delineate tissues and determine whether to proceed with delivery of therapeutic, in addition to retrieve data necessary to develop an optimal radiation treatment process for the patient. The field placement simulation documents any change seen in overall tumor volume documented in the patient’s record, allows the clinician to indicate any needed changes in the treatment plan and/or prescription, provides diagnostic evaluation as the basis for performing the therapeutic procedure, and clearly identifies the information needed to decide to proceed with the therapeutic procedure. This process includes verification of the treatment port(s) and proper treatment positioning. All treatment ports were photographed within electronic medical records. The patient's lead blocking along with gross tumor volume and margin was confirmed. Considering superficial radiotherapy is clinical in setup, this requires the physician and radiation therapist to clarify the location interest being treated against initial images, ultrasound, pathology, and patient anatomy. Care was taken to ensure torres treated were geometrically accurate and properly positioned using therapeutic radiology simulation-aided field setting verification per fraction. This process is also utilized to determine if any prescription or setup changes are necessary. These steps are therefore medically necessary to ensure safe and effective administration of radiation. Ongoing therapeutic radiology simulation-aided field setting verification is ordered throughout the course of therapy.  A high-frequency ultrasound image was acquired today for a two-dimensional evaluation of the tumor volume, depth, width, breadth, review of prior response to treatment, provide geometric accuracy of field placement, and determine whether to proceed with therapeutic delivery.\\nThe field placement and ultrasound imaging, per fraction, is separate and distinct from the initial simulation and is an important task in providing safe administration of superficial radiation therapy. Physician evaluation of the ultrasound information will be ongoing throughout the course of treatment and is deemed medically necessary to ensure the efficacy of treatment, whether to proceed with therapeutic delivery, and determine any necessary changes. Today's images were evaluated for determination of continuation of treatment with the current plan or with necessary changes as appropriate. According to the review of verification therapeutic radiology simulation-aided field setting and imaging, no change is required.\\nAdditionally, the use of ultrasound visualization and targeted assessment allows the patient to be able to see the cancer progress, encouraging the patient to complete and maintain compliance through the full course of prescribed radiotherapy. Per Dr. Rhina Owusu, continued ultrasound guidance and therapeutic radiology simulation-aided field setting verification per fraction is required for field placement, measurement of tumor depth, tissues evaluation, progress, acute effect monitoring, and determination for therapeutic treatment delivery is appropriate.
Additional Comments (Add Customization Of Note Here): Patient had no issues to report.
Energy (Kv): 100
Daily Dosage (Cgy): 275.89
Total Cumulative Dose (Cgy): 4414.24
Calculate Total Cumulative Dose Automatically Or Manually: Manually
Add X Modifier?: RYAN - Unusual Non-Overlapping Service
Ultrasound Used Text: High frequency ultrasound depth is showing inflammation.

## 2023-08-15 NOTE — PROCEDURE: IMAGE GUIDED - SUPERFICIAL RADIOTHERAPY: EVALUATION VISIT
Show Ultrasound In Note?: No
Ultrasound Not Used Text: Ultrasound was not performed today due to
Assessment: Appropriate reaction
Bill For Evaluation (24577)?: Yes
Evaluation Plan: The patient is undergoing superficial radiation therapy for skin cancer and presents for weekly evaluation and management. Per protocol and as documented on the flow sheet, the patient was questioned as to subjective redness, pruritus, pain, drainage, fatigue, or any other symptoms. Objectively, the radiation area was evaluated with regards to erythema, atrophy, scale, crusting, erosion, ulceration, edema, purpura, tenderness, warmth, drainage, and any other findings. The plan was extensively reviewed including dose and dosing schedule. The simulation and clinical setup were also reviewed as were external and any internal shields and based on this review the appropriateness and sufficiency of treatment was determined.\\n\\nA high-frequency ultrasound image was acquired today for a two-dimensional evaluation of the tumor volume, depth, width, breadth, review of prior response to treatment, provide geometric accuracy of field placement, and determine whether to proceed with therapeutic delivery.\\n\\nPer , continued ultrasound guidance and therapeutic radiology simulation-aided field setting\\n\\nverification per fraction is required for field placement, measurement of tumor depth, tissues evaluation, progress, acute effect monitoring, and determination for therapeutic treatment delivery is appropriate.
Ultrasound Used Text: Ultrasound depth is 0.00 mm.
Is This Visit For Evaluation During Treatment Or Follow Up Post Treatment?: evaluation
Radiation Therapy On[facility] (Rtog) Score: 2
Additional Comments (Add Customization Of Note Here): Patient is using cream daily.
Follow Up Plan: Physician Orders: Plan: Follow Up status post Superficial Radiotherapy with Ultrasound\\n\\nPer the request of Dr. Owusu, patient was seen today for a 10- week follow up for Superficial Radiation Therapy. Physician evaluation of the ultrasound tumor depth, width, and breadth was ongoing through course of treatment and is deemed medically necessary ensuring efficacy of treatment. All appropriate blocking and treatment parameters verified by radiation therapist according to initial simulation. A high frequency ultrasound image was acquired today for two-dimensional evaluation of treatment volume and response to treatment, in addition, geometric accuracy of field placement. Today’s image was evaluated, lesion not detected on US.\\n\\nObjectively, the treated radiation area was evaluated with regards to erythema, atrophy, scale, crusting, erosion, ulceration, edema, purpura, tenderness, warmth, drainage, and any other finding. Patient to follow up for routine visits.

## 2023-08-16 ENCOUNTER — APPOINTMENT (OUTPATIENT)
Dept: URBAN - METROPOLITAN AREA CLINIC 250 | Age: 88
Setting detail: DERMATOLOGY
End: 2023-08-21

## 2023-08-16 PROBLEM — C44.311 BASAL CELL CARCINOMA OF SKIN OF NOSE: Status: ACTIVE | Noted: 2023-08-16

## 2023-08-16 PROCEDURE — OTHER IMAGE GUIDED - SUPERFICIAL RADIOTHERAPY: TREATMENT VISIT: OTHER

## 2023-08-16 PROCEDURE — G6001 ECHO GUIDANCE RADIOTHERAPY: HCPCS | Mod: XU

## 2023-08-16 PROCEDURE — 77280 THER RAD SIMULAJ FIELD SMPL: CPT

## 2023-08-16 PROCEDURE — OTHER IMAGE GUIDED - SUPERFICIAL RADIOTHERAPY: OTHER

## 2023-08-16 PROCEDURE — 77401 RADIATION TX DELIVERY SUPFC: CPT

## 2023-08-16 NOTE — PROCEDURE: IMAGE GUIDED - SUPERFICIAL RADIOTHERAPY: TREATMENT VISIT
Ultrasound Not Used Text: Ultrasound was not performed today due to
Prescription Used: 1
Bill For Treatment (01003)?: Yes
Additional Change To Daily Dosage Administered Mid Treatment?: No
Fraction Number: 17
Treatment Documentation: Physician Orders: Radiotherapy Treatment Plan: Superficial Radiotherapy with Ultrasound\\nPlan: This patient has been treated today with image-guided superficial radiation therapy for non-melanoma skin cancer.  Written informed consent has been previously obtained from this patient for this treatment. This consent is documented in the patient's chart. The patient gave verbal consent to continue treatment today.\\nThe patient was treated with a specific radiation dose and setup as prescribed by the provider listed on this visit note. A Radiation Therapist performed administration of radiation under the supervision of a provider.\\nThe treatment parameters and cumulative dose are indicated above. Prior to administering the radiation, the patient underwent a verification therapeutic radiology simulation-aided field setting defining relevant normal and abnormal target anatomy and acquiring images with a separate and distinct diagnostic high-frequency ultrasound to delineate tissues and determine whether to proceed with delivery of therapeutic, in addition to retrieve data necessary to develop an optimal radiation treatment process for the patient. The field placement simulation documents any change seen in overall tumor volume documented in the patient’s record, allows the clinician to indicate any needed changes in the treatment plan and/or prescription, provides diagnostic evaluation as the basis for performing the therapeutic procedure, and clearly identifies the information needed to decide to proceed with the therapeutic procedure. This process includes verification of the treatment port(s) and proper treatment positioning. All treatment ports were photographed within electronic medical records. The patient's lead blocking along with gross tumor volume and margin was confirmed. Considering superficial radiotherapy is clinical in setup, this requires the physician and radiation therapist to clarify the location interest being treated against initial images, ultrasound, pathology, and patient anatomy. Care was taken to ensure torres treated were geometrically accurate and properly positioned using therapeutic radiology simulation-aided field setting verification per fraction. This process is also utilized to determine if any prescription or setup changes are necessary. These steps are therefore medically necessary to ensure safe and effective administration of radiation. Ongoing therapeutic radiology simulation-aided field setting verification is ordered throughout the course of therapy.  A high-frequency ultrasound image was acquired today for a two-dimensional evaluation of the tumor volume, depth, width, breadth, review of prior response to treatment, provide geometric accuracy of field placement, and determine whether to proceed with therapeutic delivery.\\nThe field placement and ultrasound imaging, per fraction, is separate and distinct from the initial simulation and is an important task in providing safe administration of superficial radiation therapy. Physician evaluation of the ultrasound information will be ongoing throughout the course of treatment and is deemed medically necessary to ensure the efficacy of treatment, whether to proceed with therapeutic delivery, and determine any necessary changes. Today's images were evaluated for determination of continuation of treatment with the current plan or with necessary changes as appropriate. According to the review of verification therapeutic radiology simulation-aided field setting and imaging, no change is required.\\nAdditionally, the use of ultrasound visualization and targeted assessment allows the patient to be able to see the cancer progress, encouraging the patient to complete and maintain compliance through the full course of prescribed radiotherapy. Per Dr. Rhina Owusu, continued ultrasound guidance and therapeutic radiology simulation-aided field setting verification per fraction is required for field placement, measurement of tumor depth, tissues evaluation, progress, acute effect monitoring, and determination for therapeutic treatment delivery is appropriate.
Additional Comments (Add Customization Of Note Here): Patient had no issues to report. Using cream daily.
Energy (Kv): 100
Daily Dosage (Cgy): 275.89
Total Cumulative Dose (Cgy): 4690.13
Calculate Total Cumulative Dose Automatically Or Manually: Manually
Add X Modifier?: RYAN - Unusual Non-Overlapping Service
Ultrasound Used Text: High frequency ultrasound depth is showing inflammation.

## 2023-08-22 ENCOUNTER — APPOINTMENT (OUTPATIENT)
Dept: URBAN - METROPOLITAN AREA CLINIC 250 | Age: 88
Setting detail: DERMATOLOGY
End: 2023-08-24

## 2023-08-22 PROBLEM — C44.311 BASAL CELL CARCINOMA OF SKIN OF NOSE: Status: ACTIVE | Noted: 2023-08-22

## 2023-08-22 PROCEDURE — G6001 ECHO GUIDANCE RADIOTHERAPY: HCPCS | Mod: XU

## 2023-08-22 PROCEDURE — OTHER IMAGE GUIDED - SUPERFICIAL RADIOTHERAPY: OTHER

## 2023-08-22 PROCEDURE — 77280 THER RAD SIMULAJ FIELD SMPL: CPT

## 2023-08-22 PROCEDURE — 77401 RADIATION TX DELIVERY SUPFC: CPT

## 2023-08-22 PROCEDURE — OTHER IMAGE GUIDED - SUPERFICIAL RADIOTHERAPY: TREATMENT VISIT: OTHER

## 2023-08-22 NOTE — PROCEDURE: IMAGE GUIDED - SUPERFICIAL RADIOTHERAPY: TREATMENT VISIT
Ultrasound Not Used Text: Ultrasound was not performed today due to
Prescription Used: 1
Bill For Treatment (52162)?: Yes
Additional Change To Daily Dosage Administered Mid Treatment?: No
Fraction Number: 18
Treatment Documentation: Physician Orders: Radiotherapy Treatment Plan: Superficial Radiotherapy with Ultrasound\\nPlan: This patient has been treated today with image-guided superficial radiation therapy for non-melanoma skin cancer.  Written informed consent has been previously obtained from this patient for this treatment. This consent is documented in the patient's chart. The patient gave verbal consent to continue treatment today.\\nThe patient was treated with a specific radiation dose and setup as prescribed by the provider listed on this visit note. A Radiation Therapist performed administration of radiation under the supervision of a provider.\\nThe treatment parameters and cumulative dose are indicated above. Prior to administering the radiation, the patient underwent a verification therapeutic radiology simulation-aided field setting defining relevant normal and abnormal target anatomy and acquiring images with a separate and distinct diagnostic high-frequency ultrasound to delineate tissues and determine whether to proceed with delivery of therapeutic, in addition to retrieve data necessary to develop an optimal radiation treatment process for the patient. The field placement simulation documents any change seen in overall tumor volume documented in the patient’s record, allows the clinician to indicate any needed changes in the treatment plan and/or prescription, provides diagnostic evaluation as the basis for performing the therapeutic procedure, and clearly identifies the information needed to decide to proceed with the therapeutic procedure. This process includes verification of the treatment port(s) and proper treatment positioning. All treatment ports were photographed within electronic medical records. The patient's lead blocking along with gross tumor volume and margin was confirmed. Considering superficial radiotherapy is clinical in setup, this requires the physician and radiation therapist to clarify the location interest being treated against initial images, ultrasound, pathology, and patient anatomy. Care was taken to ensure torres treated were geometrically accurate and properly positioned using therapeutic radiology simulation-aided field setting verification per fraction. This process is also utilized to determine if any prescription or setup changes are necessary. These steps are therefore medically necessary to ensure safe and effective administration of radiation. Ongoing therapeutic radiology simulation-aided field setting verification is ordered throughout the course of therapy.  A high-frequency ultrasound image was acquired today for a two-dimensional evaluation of the tumor volume, depth, width, breadth, review of prior response to treatment, provide geometric accuracy of field placement, and determine whether to proceed with therapeutic delivery.\\nThe field placement and ultrasound imaging, per fraction, is separate and distinct from the initial simulation and is an important task in providing safe administration of superficial radiation therapy. Physician evaluation of the ultrasound information will be ongoing throughout the course of treatment and is deemed medically necessary to ensure the efficacy of treatment, whether to proceed with therapeutic delivery, and determine any necessary changes. Today's images were evaluated for determination of continuation of treatment with the current plan or with necessary changes as appropriate. According to the review of verification therapeutic radiology simulation-aided field setting and imaging, no change is required.\\nAdditionally, the use of ultrasound visualization and targeted assessment allows the patient to be able to see the cancer progress, encouraging the patient to complete and maintain compliance through the full course of prescribed radiotherapy. Per Dr. Rhina Owusu, continued ultrasound guidance and therapeutic radiology simulation-aided field setting verification per fraction is required for field placement, measurement of tumor depth, tissues evaluation, progress, acute effect monitoring, and determination for therapeutic treatment delivery is appropriate.
Additional Comments (Add Customization Of Note Here): Patient had no issues to report. Using cream daily.
Energy (Kv): 100
Daily Dosage (Cgy): 275.89
Total Cumulative Dose (Cgy): 4966.02
Calculate Total Cumulative Dose Automatically Or Manually: Manually
Add X Modifier?: RYAN - Unusual Non-Overlapping Service
Ultrasound Used Text: High frequency ultrasound depth is showing inflammation.

## 2023-08-23 ENCOUNTER — APPOINTMENT (OUTPATIENT)
Dept: URBAN - METROPOLITAN AREA CLINIC 250 | Age: 88
Setting detail: DERMATOLOGY
End: 2023-08-28

## 2023-08-23 PROBLEM — C44.311 BASAL CELL CARCINOMA OF SKIN OF NOSE: Status: ACTIVE | Noted: 2023-08-23

## 2023-08-23 PROCEDURE — OTHER IMAGE GUIDED - SUPERFICIAL RADIOTHERAPY: TREATMENT VISIT: OTHER

## 2023-08-23 PROCEDURE — 77280 THER RAD SIMULAJ FIELD SMPL: CPT

## 2023-08-23 PROCEDURE — OTHER IMAGE GUIDED - SUPERFICIAL RADIOTHERAPY: OTHER

## 2023-08-23 PROCEDURE — G6001 ECHO GUIDANCE RADIOTHERAPY: HCPCS | Mod: XU

## 2023-08-23 PROCEDURE — 77401 RADIATION TX DELIVERY SUPFC: CPT

## 2023-08-23 NOTE — PROCEDURE: IMAGE GUIDED - SUPERFICIAL RADIOTHERAPY: TREATMENT VISIT
Ultrasound Not Used Text: Ultrasound was not performed today due to
Prescription Used: 1
Bill For Treatment ([id number])?: Yes
Additional Change To Daily Dosage Administered Mid Treatment?: No
Fraction Number: 19
Treatment Documentation: Physician Orders: Radiotherapy Treatment Plan: Superficial Radiotherapy with Ultrasound\\nPlan: This patient has been treated today with image-guided superficial radiation therapy for non-melanoma skin cancer.  Written informed consent has been previously obtained from this patient for this treatment. This consent is documented in the patient's chart. The patient gave verbal consent to continue treatment today.\\nThe patient was treated with a specific radiation dose and setup as prescribed by the provider listed on this visit note. A Radiation Therapist performed administration of radiation under the supervision of a provider.\\nThe treatment parameters and cumulative dose are indicated above. Prior to administering the radiation, the patient underwent a verification therapeutic radiology simulation-aided field setting defining relevant normal and abnormal target anatomy and acquiring images with a separate and distinct diagnostic high-frequency ultrasound to delineate tissues and determine whether to proceed with delivery of therapeutic, in addition to retrieve data necessary to develop an optimal radiation treatment process for the patient. The field placement simulation documents any change seen in overall tumor volume documented in the patient’s record, allows the clinician to indicate any needed changes in the treatment plan and/or prescription, provides diagnostic evaluation as the basis for performing the therapeutic procedure, and clearly identifies the information needed to decide to proceed with the therapeutic procedure. This process includes verification of the treatment port(s) and proper treatment positioning. All treatment ports were photographed within electronic medical records. The patient's lead blocking along with gross tumor volume and margin was confirmed. Considering superficial radiotherapy is clinical in setup, this requires the physician and radiation therapist to clarify the location interest being treated against initial images, ultrasound, pathology, and patient anatomy. Care was taken to ensure torres treated were geometrically accurate and properly positioned using therapeutic radiology simulation-aided field setting verification per fraction. This process is also utilized to determine if any prescription or setup changes are necessary. These steps are therefore medically necessary to ensure safe and effective administration of radiation. Ongoing therapeutic radiology simulation-aided field setting verification is ordered throughout the course of therapy.  A high-frequency ultrasound image was acquired today for a two-dimensional evaluation of the tumor volume, depth, width, breadth, review of prior response to treatment, provide geometric accuracy of field placement, and determine whether to proceed with therapeutic delivery.\\nThe field placement and ultrasound imaging, per fraction, is separate and distinct from the initial simulation and is an important task in providing safe administration of superficial radiation therapy. Physician evaluation of the ultrasound information will be ongoing throughout the course of treatment and is deemed medically necessary to ensure the efficacy of treatment, whether to proceed with therapeutic delivery, and determine any necessary changes. Today's images were evaluated for determination of continuation of treatment with the current plan or with necessary changes as appropriate. According to the review of verification therapeutic radiology simulation-aided field setting and imaging, no change is required.\\nAdditionally, the use of ultrasound visualization and targeted assessment allows the patient to be able to see the cancer progress, encouraging the patient to complete and maintain compliance through the full course of prescribed radiotherapy. Per Dr. Rhina Owusu, continued ultrasound guidance and therapeutic radiology simulation-aided field setting verification per fraction is required for field placement, measurement of tumor depth, tissues evaluation, progress, acute effect monitoring, and determination for therapeutic treatment delivery is appropriate.
Additional Comments (Add Customization Of Note Here): Patient did well today and has 1 fraction remaining.
Energy (Kv): 100
Daily Dosage (Cgy): 275.89
Total Cumulative Dose (Cgy): 5241.91
Calculate Total Cumulative Dose Automatically Or Manually: Manually
Add X Modifier?: RYAN - Unusual Non-Overlapping Service
Ultrasound Used Text: High frequency ultrasound depth is showing inflammation.

## 2023-08-24 ENCOUNTER — APPOINTMENT (OUTPATIENT)
Dept: URBAN - METROPOLITAN AREA CLINIC 250 | Age: 88
Setting detail: DERMATOLOGY
End: 2023-08-27

## 2023-08-24 PROBLEM — C44.311 BASAL CELL CARCINOMA OF SKIN OF NOSE: Status: ACTIVE | Noted: 2023-08-24

## 2023-08-24 PROCEDURE — OTHER IMAGE GUIDED - SUPERFICIAL RADIOTHERAPY: OTHER

## 2023-08-24 PROCEDURE — G6001 ECHO GUIDANCE RADIOTHERAPY: HCPCS | Mod: XU

## 2023-08-24 PROCEDURE — 77401 RADIATION TX DELIVERY SUPFC: CPT

## 2023-08-24 PROCEDURE — OTHER IMAGE GUIDED - SUPERFICIAL RADIOTHERAPY: TREATMENT VISIT: OTHER

## 2023-08-24 PROCEDURE — 77280 THER RAD SIMULAJ FIELD SMPL: CPT

## 2023-08-24 NOTE — PROCEDURE: IMAGE GUIDED - SUPERFICIAL RADIOTHERAPY: TREATMENT VISIT
Ultrasound Not Used Text: Ultrasound was not performed today due to
Prescription Used: 1
Bill For Treatment (41722)?: Yes
Additional Change To Daily Dosage Administered Mid Treatment?: No
Fraction Number: 20
Treatment Documentation: Physician Orders: Radiotherapy Treatment Plan: Superficial Radiotherapy with Ultrasound\\nPlan: This patient has been treated today with image-guided superficial radiation therapy for non-melanoma skin cancer.  Written informed consent has been previously obtained from this patient for this treatment. This consent is documented in the patient's chart. The patient gave verbal consent to continue treatment today.\\nThe patient was treated with a specific radiation dose and setup as prescribed by the provider listed on this visit note. A Radiation Therapist performed administration of radiation under the supervision of a provider.\\nThe treatment parameters and cumulative dose are indicated above. Prior to administering the radiation, the patient underwent a verification therapeutic radiology simulation-aided field setting defining relevant normal and abnormal target anatomy and acquiring images with a separate and distinct diagnostic high-frequency ultrasound to delineate tissues and determine whether to proceed with delivery of therapeutic, in addition to retrieve data necessary to develop an optimal radiation treatment process for the patient. The field placement simulation documents any change seen in overall tumor volume documented in the patient’s record, allows the clinician to indicate any needed changes in the treatment plan and/or prescription, provides diagnostic evaluation as the basis for performing the therapeutic procedure, and clearly identifies the information needed to decide to proceed with the therapeutic procedure. This process includes verification of the treatment port(s) and proper treatment positioning. All treatment ports were photographed within electronic medical records. The patient's lead blocking along with gross tumor volume and margin was confirmed. Considering superficial radiotherapy is clinical in setup, this requires the physician and radiation therapist to clarify the location interest being treated against initial images, ultrasound, pathology, and patient anatomy. Care was taken to ensure torres treated were geometrically accurate and properly positioned using therapeutic radiology simulation-aided field setting verification per fraction. This process is also utilized to determine if any prescription or setup changes are necessary. These steps are therefore medically necessary to ensure safe and effective administration of radiation. Ongoing therapeutic radiology simulation-aided field setting verification is ordered throughout the course of therapy.  A high-frequency ultrasound image was acquired today for a two-dimensional evaluation of the tumor volume, depth, width, breadth, review of prior response to treatment, provide geometric accuracy of field placement, and determine whether to proceed with therapeutic delivery.\\nThe field placement and ultrasound imaging, per fraction, is separate and distinct from the initial simulation and is an important task in providing safe administration of superficial radiation therapy. Physician evaluation of the ultrasound information will be ongoing throughout the course of treatment and is deemed medically necessary to ensure the efficacy of treatment, whether to proceed with therapeutic delivery, and determine any necessary changes. Today's images were evaluated for determination of continuation of treatment with the current plan or with necessary changes as appropriate. According to the review of verification therapeutic radiology simulation-aided field setting and imaging, no change is required.\\nAdditionally, the use of ultrasound visualization and targeted assessment allows the patient to be able to see the cancer progress, encouraging the patient to complete and maintain compliance through the full course of prescribed radiotherapy. Per Dr. Rhina Owusu, continued ultrasound guidance and therapeutic radiology simulation-aided field setting verification per fraction is required for field placement, measurement of tumor depth, tissues evaluation, progress, acute effect monitoring, and determination for therapeutic treatment delivery is appropriate.
Additional Comments (Add Customization Of Note Here): Patient completed treatment today and will follow up in 8 weeks.
Energy (Kv): 100
Daily Dosage (Cgy): 275.89
Total Cumulative Dose (Cgy): 5517.8
Calculate Total Cumulative Dose Automatically Or Manually: Manually
Add X Modifier?: RYAN - Unusual Non-Overlapping Service
Ultrasound Used Text: High frequency ultrasound depth is showing inflammation.

## 2023-09-16 ENCOUNTER — APPOINTMENT (OUTPATIENT)
Dept: URBAN - METROPOLITAN AREA CLINIC 250 | Age: 88
Setting detail: DERMATOLOGY
End: 2024-01-03

## 2023-09-16 PROBLEM — C44.311 BASAL CELL CARCINOMA OF SKIN OF NOSE: Status: ACTIVE | Noted: 2023-09-16

## 2023-09-16 PROCEDURE — OTHER IMAGE GUIDED - SUPERFICIAL RADIOTHERAPY: OTHER

## 2023-09-16 PROCEDURE — 77336 RADIATION PHYSICS CONSULT: CPT

## 2023-09-16 NOTE — PROCEDURE: IMAGE GUIDED - SUPERFICIAL RADIOTHERAPY
Detail Level: Detailed
Field Number: 1
Lesion Dimensions-X Axis In Cm: 0.3
Lesion Margin Size In Cm: 0.5
Shield Size In Cm: 1.5x1.5
Applicator Size In Cm: 2.0 cm
Energy (Kv): 100
Treatment Time (Min): 0.47
Time, Dose, Fractionation Factor (Tdf) For Prescription 1: 98
Daily Dose (Cgy): 275.89
Number Of Fractions For Prescription 1: 20
Treatments Per Week: 3
Total Dose For Prescription 1 (Cgy): 5517.80
Add A Second Prescription?: No
Additional Fraction(S) Needed:: 0
Add Physics Consultation: Yes
Physics Consultation Performed For Fractions:: 1-20
Physics Documentation: Per the request of Dr. Owusu, continuing medical physics review as per radiotherapy standard of care post every 5th fraction for patient, including assessment of treatment parameters,  of dose delivery, and review of patient treatment documentation in support of the provider, to ensure efficacy and continued safe delivery of radiotherapy. Included in physics check is review of patient setup information, all pertinent simulation and treatment photographs checks, prescription, dose calculation verification, per fraction dose charted correctly, elapsed days and treatment days correctly charted, cumulative dose correct, and review of any prescription changes. Patient was not present, nor was it necessary for the patient to be present for weekly physics review and no other superficial radiotherapy services were rendered on this day. Continued medical physics review post every 5th fraction of therapy is requested by provider for appropriate radiotherapy management and is deemed medically necessary and standard of care.

## 2023-10-18 ENCOUNTER — APPOINTMENT (OUTPATIENT)
Dept: URBAN - METROPOLITAN AREA CLINIC 250 | Age: 88
Setting detail: DERMATOLOGY
End: 2023-10-19

## 2023-10-18 PROBLEM — C44.311 BASAL CELL CARCINOMA OF SKIN OF NOSE: Status: ACTIVE | Noted: 2023-10-18

## 2023-10-18 PROCEDURE — OTHER IMAGE GUIDED - SUPERFICIAL RADIOTHERAPY: EVALUATION VISIT: OTHER

## 2023-10-18 PROCEDURE — OTHER IMAGE GUIDED - SUPERFICIAL RADIOTHERAPY: OTHER

## 2023-10-18 PROCEDURE — G6001 ECHO GUIDANCE RADIOTHERAPY: HCPCS

## 2023-10-18 PROCEDURE — 77427 RADIATION TX MANAGEMENT X5: CPT

## 2023-10-18 NOTE — PROCEDURE: IMAGE GUIDED - SUPERFICIAL RADIOTHERAPY: EVALUATION VISIT
Additional Comments (Add Customization Of Note Here): Patient will come back in 6 weeks for another ultrasound, due to inflammation present.
Ultrasound Used Text: Ultrasound is showing edema.
Bill For Evaluation (05740)?: Yes
Is This Visit For Evaluation During Treatment Or Follow Up Post Treatment?: follow up
Ultrasound Not Used Text: Ultrasound was not performed today due to
Assessment: Appropriate reaction
Radiation Therapy Oncology Group (Rtog) Score: 0
Follow Up Plan: Per the request of Dr. Owusu, patient was seen today for a 8 week follow up for Superficial Radiation Therapy. Physician evaluation of the ultrasound tumor depth, width, and breadth was ongoing through course of treatment and is deemed medically necessary ensuring efficacy of treatment. All appropriate blocking and treatment parameters verified by radiation therapist according to initial simulation. A high frequency ultrasound image was acquired today for two-dimensional evaluation of treatment volume and response to treatment, in addition, geometric accuracy of field placement. Today’s image was evaluated, lesion unable to be detected on US, due to inflammation.\\n\\nObjectively, the treated radiation area was evaluated with regards to erythema, atrophy, scale, crusting, erosion, ulceration, edema, purpura, tenderness, warmth, drainage, and any other finding. Patient to follow up for routine visits.

## 2023-11-29 ENCOUNTER — APPOINTMENT (OUTPATIENT)
Dept: URBAN - METROPOLITAN AREA CLINIC 250 | Age: 88
Setting detail: DERMATOLOGY
End: 2023-11-30

## 2023-11-29 PROBLEM — C44.311 BASAL CELL CARCINOMA OF SKIN OF NOSE: Status: ACTIVE | Noted: 2023-11-29

## 2023-11-29 PROCEDURE — OTHER IMAGE GUIDED - SUPERFICIAL RADIOTHERAPY: EVALUATION VISIT: OTHER

## 2023-11-29 PROCEDURE — 99213 OFFICE O/P EST LOW 20 MIN: CPT | Mod: 25

## 2023-11-29 PROCEDURE — G6001 ECHO GUIDANCE RADIOTHERAPY: HCPCS

## 2023-11-29 PROCEDURE — OTHER IMAGE GUIDED - SUPERFICIAL RADIOTHERAPY: OTHER

## 2023-11-29 NOTE — PROCEDURE: IMAGE GUIDED - SUPERFICIAL RADIOTHERAPY: EVALUATION VISIT
Additional Comments (Add Customization Of Note Here): Patient was doing well and he will come in for his skin checks.
Ultrasound Used Text: No lesion or edema was detected on ultrasound.
Bill For Evaluation ([id number])?: Yes
Is This Visit For Evaluation During Treatment Or Follow Up Post Treatment?: follow up
Ultrasound Not Used Text: Ultrasound was not performed today due to
Assessment: Appropriate reaction
Radiation Therapy Oncology Group (Rtog) Score: 0
Follow Up Plan: Per the request of Dr. Owusu, patient was seen today for a 14 week follow up for Superficial Radiation Therapy. Physician evaluation of the ultrasound tumor depth, width, and breadth was ongoing through course of treatment and is deemed medically necessary ensuring efficacy of treatment. All appropriate blocking and treatment parameters verified by radiation therapist according to initial simulation. A high frequency ultrasound image was acquired today for two-dimensional evaluation of treatment volume and response to treatment, in addition, geometric accuracy of field placement. Today’s image was evaluated, lesion unable to be detected on US, due to inflammation.\\n\\nObjectively, the treated radiation area was evaluated with regards to erythema, atrophy, scale, crusting, erosion, ulceration, edema, purpura, tenderness, warmth, drainage, and any other finding. Patient to follow up for routine visits.

## 2024-01-29 ENCOUNTER — LAB REQUISITION (OUTPATIENT)
Dept: LAB | Age: 89
End: 2024-01-29

## 2024-01-29 DIAGNOSIS — C61 MALIGNANT NEOPLASM OF PROSTATE (CMD): ICD-10-CM

## 2024-01-29 LAB — PSA SERPL-MCNC: 1.89 NG/ML

## 2024-01-29 PROCEDURE — 84153 ASSAY OF PSA TOTAL: CPT | Performed by: CLINICAL MEDICAL LABORATORY

## 2024-05-15 ENCOUNTER — RX ONLY (RX ONLY)
Age: 89
End: 2024-05-15

## 2024-05-15 ENCOUNTER — APPOINTMENT (OUTPATIENT)
Dept: URBAN - METROPOLITAN AREA CLINIC 250 | Age: 89
Setting detail: DERMATOLOGY
End: 2024-05-15

## 2024-05-15 DIAGNOSIS — L82.1 OTHER SEBORRHEIC KERATOSIS: ICD-10-CM

## 2024-05-15 DIAGNOSIS — L57.0 ACTINIC KERATOSIS: ICD-10-CM

## 2024-05-15 DIAGNOSIS — D18.0 HEMANGIOMA: ICD-10-CM

## 2024-05-15 DIAGNOSIS — L81.4 OTHER MELANIN HYPERPIGMENTATION: ICD-10-CM

## 2024-05-15 DIAGNOSIS — Z85.828 PERSONAL HISTORY OF OTHER MALIGNANT NEOPLASM OF SKIN: ICD-10-CM

## 2024-05-15 PROBLEM — D18.01 HEMANGIOMA OF SKIN AND SUBCUTANEOUS TISSUE: Status: ACTIVE | Noted: 2024-05-15

## 2024-05-15 PROCEDURE — OTHER COUNSELING: OTHER

## 2024-05-15 PROCEDURE — 17003 DESTRUCT PREMALG LES 2-14: CPT

## 2024-05-15 PROCEDURE — OTHER LIQUID NITROGEN: OTHER

## 2024-05-15 PROCEDURE — OTHER MIPS QUALITY: OTHER

## 2024-05-15 PROCEDURE — 17000 DESTRUCT PREMALG LESION: CPT

## 2024-05-15 PROCEDURE — OTHER REASSURANCE: OTHER

## 2024-05-15 PROCEDURE — 99213 OFFICE O/P EST LOW 20 MIN: CPT | Mod: 25

## 2024-05-15 PROCEDURE — OTHER SUNSCREEN RECOMMENDATIONS: OTHER

## 2024-05-15 RX ORDER — KETOCONAZOLE 20 MG/ML
SHAMPOO, SUSPENSION TOPICAL BIW
Qty: 120 | Refills: 6 | Status: ERX

## 2024-05-15 ASSESSMENT — LOCATION SIMPLE DESCRIPTION DERM
LOCATION SIMPLE: RIGHT POSTERIOR THIGH
LOCATION SIMPLE: LEFT UPPER BACK
LOCATION SIMPLE: RIGHT SHOULDER
LOCATION SIMPLE: RIGHT UPPER ARM
LOCATION SIMPLE: LEFT SHOULDER
LOCATION SIMPLE: ABDOMEN
LOCATION SIMPLE: LEFT POSTERIOR THIGH
LOCATION SIMPLE: RIGHT CALF
LOCATION SIMPLE: RIGHT UPPER BACK
LOCATION SIMPLE: LEFT UPPER ARM
LOCATION SIMPLE: LEFT FOREARM
LOCATION SIMPLE: CHEST
LOCATION SIMPLE: RIGHT PRETIBIAL REGION
LOCATION SIMPLE: RIGHT LOWER BACK
LOCATION SIMPLE: RIGHT FOREARM
LOCATION SIMPLE: LEFT PRETIBIAL REGION
LOCATION SIMPLE: LEFT THIGH
LOCATION SIMPLE: NOSE
LOCATION SIMPLE: LEFT EAR
LOCATION SIMPLE: LEFT LOWER BACK
LOCATION SIMPLE: LEFT CALF
LOCATION SIMPLE: LEFT CHEEK
LOCATION SIMPLE: RIGHT THIGH
LOCATION SIMPLE: RIGHT NOSE

## 2024-05-15 ASSESSMENT — LOCATION DETAILED DESCRIPTION DERM
LOCATION DETAILED: EPIGASTRIC SKIN
LOCATION DETAILED: RIGHT NASAL ALA
LOCATION DETAILED: LEFT POSTERIOR SHOULDER
LOCATION DETAILED: LEFT DISTAL CALF
LOCATION DETAILED: LEFT SUPERIOR HELIX
LOCATION DETAILED: RIGHT DISTAL POSTERIOR UPPER ARM
LOCATION DETAILED: RIGHT SUPERIOR LATERAL UPPER BACK
LOCATION DETAILED: RIGHT DISTAL PRETIBIAL REGION
LOCATION DETAILED: LEFT DISTAL POSTERIOR THIGH
LOCATION DETAILED: LEFT SUPERIOR MEDIAL UPPER BACK
LOCATION DETAILED: LEFT MID PREAURICULAR CHEEK
LOCATION DETAILED: RIGHT ANTERIOR SHOULDER
LOCATION DETAILED: NASAL TIP
LOCATION DETAILED: LEFT ANTERIOR SHOULDER
LOCATION DETAILED: RIGHT DISTAL POSTERIOR THIGH
LOCATION DETAILED: LEFT SUPERIOR LATERAL LOWER BACK
LOCATION DETAILED: RIGHT SUPERIOR LATERAL MIDBACK
LOCATION DETAILED: RIGHT DISTAL CALF
LOCATION DETAILED: RIGHT MEDIAL INFERIOR CHEST
LOCATION DETAILED: PERIUMBILICAL SKIN
LOCATION DETAILED: LEFT DISTAL DORSAL FOREARM
LOCATION DETAILED: RIGHT POSTERIOR SHOULDER
LOCATION DETAILED: LEFT PROXIMAL POSTERIOR UPPER ARM
LOCATION DETAILED: LEFT PROXIMAL PRETIBIAL REGION
LOCATION DETAILED: LEFT LATERAL UPPER BACK
LOCATION DETAILED: LEFT SUPERIOR PREAURICULAR CHEEK
LOCATION DETAILED: LEFT INFERIOR UPPER BACK
LOCATION DETAILED: LEFT INFERIOR MEDIAL MIDBACK
LOCATION DETAILED: RIGHT LATERAL UPPER BACK
LOCATION DETAILED: LEFT ANTERIOR DISTAL THIGH
LOCATION DETAILED: RIGHT INFERIOR LATERAL MIDBACK
LOCATION DETAILED: RIGHT SUPERIOR UPPER BACK
LOCATION DETAILED: LEFT MEDIAL INFERIOR CHEST
LOCATION DETAILED: RIGHT ANTERIOR PROXIMAL THIGH
LOCATION DETAILED: LEFT LATERAL ABDOMEN
LOCATION DETAILED: RIGHT PROXIMAL DORSAL FOREARM

## 2024-05-15 ASSESSMENT — LOCATION ZONE DERM
LOCATION ZONE: TRUNK
LOCATION ZONE: ARM
LOCATION ZONE: FACE
LOCATION ZONE: EAR
LOCATION ZONE: NOSE
LOCATION ZONE: LEG

## 2024-05-15 NOTE — PROCEDURE: LIQUID NITROGEN
Number Of Freeze-Thaw Cycles: 3 freeze-thaw cycles
Render Post-Care Instructions In Note?: no
Show Aperture Variable?: Yes
Consent: The patient's consent was obtained including but not limited to risks of crusting, scabbing, blistering, scarring, darker or lighter pigmentary change, recurrence, incomplete removal and infection.
Detail Level: Detailed
Duration Of Freeze Thaw-Cycle (Seconds): 3
Post-Care Instructions: I reviewed with the patient in detail post-care instructions. Patient is to wear sunprotection, and avoid picking at any of the treated lesions. Pt may apply Vaseline to crusted or scabbing areas.

## 2024-06-13 ENCOUNTER — APPOINTMENT (OUTPATIENT)
Dept: URBAN - METROPOLITAN AREA CLINIC 250 | Age: 89
Setting detail: DERMATOLOGY
End: 2024-06-13

## 2024-06-13 ENCOUNTER — RX ONLY (RX ONLY)
Age: 89
End: 2024-06-13

## 2024-06-13 DIAGNOSIS — L57.0 ACTINIC KERATOSIS: ICD-10-CM

## 2024-06-13 PROCEDURE — 99213 OFFICE O/P EST LOW 20 MIN: CPT

## 2024-06-13 PROCEDURE — OTHER MIPS QUALITY: OTHER

## 2024-06-13 PROCEDURE — OTHER COUNSELING: OTHER

## 2024-06-13 RX ORDER — KETOCONAZOLE 20 MG/ML
SHAMPOO, SUSPENSION TOPICAL BIW
Qty: 120 | Refills: 6 | Status: ERX

## 2024-06-13 ASSESSMENT — LOCATION ZONE DERM
LOCATION ZONE: NOSE
LOCATION ZONE: FACE
LOCATION ZONE: ARM
LOCATION ZONE: EAR

## 2024-06-13 ASSESSMENT — LOCATION SIMPLE DESCRIPTION DERM
LOCATION SIMPLE: LEFT CHEEK
LOCATION SIMPLE: LEFT FOREARM
LOCATION SIMPLE: LEFT EAR
LOCATION SIMPLE: NOSE

## 2024-06-13 ASSESSMENT — LOCATION DETAILED DESCRIPTION DERM
LOCATION DETAILED: LEFT SUPERIOR PREAURICULAR CHEEK
LOCATION DETAILED: NASAL SUPRATIP
LOCATION DETAILED: LEFT DISTAL DORSAL FOREARM
LOCATION DETAILED: LEFT SUPERIOR HELIX

## 2024-11-13 ENCOUNTER — APPOINTMENT (OUTPATIENT)
Age: 89
Setting detail: DERMATOLOGY
End: 2024-11-14

## 2024-11-13 DIAGNOSIS — L82.1 OTHER SEBORRHEIC KERATOSIS: ICD-10-CM

## 2024-11-13 DIAGNOSIS — Z85.828 PERSONAL HISTORY OF OTHER MALIGNANT NEOPLASM OF SKIN: ICD-10-CM

## 2024-11-13 DIAGNOSIS — D18.0 HEMANGIOMA: ICD-10-CM

## 2024-11-13 DIAGNOSIS — T07XXXA ABRASION OR FRICTION BURN OF OTHER, MULTIPLE, AND UNSPECIFIED SITES, WITHOUT MENTION OF INFECTION: ICD-10-CM

## 2024-11-13 DIAGNOSIS — L57.0 ACTINIC KERATOSIS: ICD-10-CM

## 2024-11-13 DIAGNOSIS — L81.4 OTHER MELANIN HYPERPIGMENTATION: ICD-10-CM

## 2024-11-13 PROBLEM — D18.01 HEMANGIOMA OF SKIN AND SUBCUTANEOUS TISSUE: Status: ACTIVE | Noted: 2024-11-13

## 2024-11-13 PROBLEM — S80.922A UNSPECIFIED SUPERFICIAL INJURY OF LEFT LOWER LEG, INITIAL ENCOUNTER: Status: ACTIVE | Noted: 2024-11-13

## 2024-11-13 PROCEDURE — OTHER LIQUID NITROGEN: OTHER

## 2024-11-13 PROCEDURE — OTHER COUNSELING: OTHER

## 2024-11-13 PROCEDURE — 99213 OFFICE O/P EST LOW 20 MIN: CPT | Mod: 25

## 2024-11-13 PROCEDURE — OTHER REASSURANCE: OTHER

## 2024-11-13 PROCEDURE — 17003 DESTRUCT PREMALG LES 2-14: CPT

## 2024-11-13 PROCEDURE — OTHER SUNSCREEN RECOMMENDATIONS: OTHER

## 2024-11-13 PROCEDURE — OTHER MIPS QUALITY: OTHER

## 2024-11-13 PROCEDURE — 17000 DESTRUCT PREMALG LESION: CPT

## 2024-11-13 ASSESSMENT — LOCATION ZONE DERM
LOCATION ZONE: HAND
LOCATION ZONE: NOSE
LOCATION ZONE: ARM
LOCATION ZONE: LEG
LOCATION ZONE: NECK
LOCATION ZONE: FACE
LOCATION ZONE: LIP
LOCATION ZONE: TRUNK

## 2024-11-13 ASSESSMENT — LOCATION DETAILED DESCRIPTION DERM
LOCATION DETAILED: LEFT RADIAL DORSAL HAND
LOCATION DETAILED: LEFT DISTAL POSTERIOR UPPER ARM
LOCATION DETAILED: RIGHT PROXIMAL RADIAL DORSAL FOREARM
LOCATION DETAILED: RIGHT INFERIOR CENTRAL MALAR CHEEK
LOCATION DETAILED: RIGHT INFERIOR LATERAL MIDBACK
LOCATION DETAILED: LEFT PROXIMAL DORSAL FOREARM
LOCATION DETAILED: RIGHT DISTAL POSTERIOR UPPER ARM
LOCATION DETAILED: LEFT DISTAL PRETIBIAL REGION
LOCATION DETAILED: RIGHT NASAL SIDEWALL
LOCATION DETAILED: LEFT SUPERIOR PREAURICULAR CHEEK
LOCATION DETAILED: LEFT DISTAL DORSAL FOREARM
LOCATION DETAILED: LEFT DORSAL INDEX METACARPOPHALANGEAL JOINT
LOCATION DETAILED: LEFT LATERAL ABDOMEN
LOCATION DETAILED: RIGHT CLAVICULAR NECK
LOCATION DETAILED: RIGHT SUPERIOR LATERAL NECK
LOCATION DETAILED: RIGHT SUPERIOR UPPER BACK
LOCATION DETAILED: LEFT UPPER CUTANEOUS LIP
LOCATION DETAILED: LEFT MEDIAL INFERIOR CHEST
LOCATION DETAILED: LEFT MID-UPPER BACK
LOCATION DETAILED: RIGHT RIB CAGE
LOCATION DETAILED: LEFT INFERIOR LATERAL MIDBACK
LOCATION DETAILED: RIGHT MEDIAL INFERIOR CHEST
LOCATION DETAILED: LEFT CLAVICULAR NECK
LOCATION DETAILED: RIGHT DISTAL DORSAL FOREARM

## 2024-11-13 ASSESSMENT — LOCATION SIMPLE DESCRIPTION DERM
LOCATION SIMPLE: RIGHT UPPER ARM
LOCATION SIMPLE: ABDOMEN
LOCATION SIMPLE: RIGHT FOREARM
LOCATION SIMPLE: LEFT HAND
LOCATION SIMPLE: LEFT PRETIBIAL REGION
LOCATION SIMPLE: CHEST
LOCATION SIMPLE: RIGHT ANTERIOR NECK
LOCATION SIMPLE: LEFT LOWER BACK
LOCATION SIMPLE: LEFT FOREARM
LOCATION SIMPLE: RIGHT NOSE
LOCATION SIMPLE: LEFT UPPER BACK
LOCATION SIMPLE: RIGHT UPPER BACK
LOCATION SIMPLE: NECK
LOCATION SIMPLE: LEFT CHEEK
LOCATION SIMPLE: LEFT ANTERIOR NECK
LOCATION SIMPLE: RIGHT CHEEK
LOCATION SIMPLE: LEFT LIP
LOCATION SIMPLE: RIGHT LOWER BACK
LOCATION SIMPLE: LEFT UPPER ARM

## 2024-11-13 NOTE — PROCEDURE: MIPS QUALITY
Quality 130: Documentation Of Current Medications In The Medical Record: Current Medications Documented
Quality 431: Preventive Care And Screening: Unhealthy Alcohol Use - Screening: Patient not identified as an unhealthy alcohol user when screened for unhealthy alcohol use using a systematic screening method
Detail Level: Detailed
Quality 487: Screening For Social Drivers Of Health: Patient screened for food insecurity, housing instability, transportation needs, utility difficulties, and interpersonal safety
Quality 226: Preventive Care And Screening: Tobacco Use: Screening And Cessation Intervention: Patient screened for tobacco use and is an ex/non-smoker

## 2024-11-13 NOTE — PROCEDURE: LIQUID NITROGEN
Render Post-Care Instructions In Note?: no
Number Of Freeze-Thaw Cycles: 3 freeze-thaw cycles
Post-Care Instructions: I reviewed with the patient in detail post-care instructions. Patient is to wear sunprotection, and avoid picking at any of the treated lesions. Pt may apply Vaseline to crusted or scabbing areas.
Consent: The patient's consent was obtained including but not limited to risks of crusting, scabbing, blistering, scarring, darker or lighter pigmentary change, recurrence, incomplete removal and infection.
Detail Level: Detailed
Show Applicator Variable?: Yes
Duration Of Freeze Thaw-Cycle (Seconds): 3
- - -

## 2024-12-11 ENCOUNTER — APPOINTMENT (OUTPATIENT)
Age: 89
Setting detail: DERMATOLOGY
End: 2024-12-12

## 2024-12-11 DIAGNOSIS — Z87.2 PERSONAL HISTORY OF DISEASES OF THE SKIN AND SUBCUTANEOUS TISSUE: ICD-10-CM

## 2024-12-11 PROCEDURE — OTHER COUNSELING: OTHER

## 2024-12-11 PROCEDURE — OTHER MIPS QUALITY: OTHER

## 2024-12-11 PROCEDURE — 99212 OFFICE O/P EST SF 10 MIN: CPT

## 2024-12-11 ASSESSMENT — LOCATION ZONE DERM
LOCATION ZONE: FACE
LOCATION ZONE: NECK
LOCATION ZONE: LIP
LOCATION ZONE: HAND

## 2024-12-11 ASSESSMENT — LOCATION SIMPLE DESCRIPTION DERM
LOCATION SIMPLE: RIGHT CHEEK
LOCATION SIMPLE: LEFT HAND
LOCATION SIMPLE: NECK
LOCATION SIMPLE: LEFT CHEEK
LOCATION SIMPLE: LEFT LIP

## 2024-12-11 ASSESSMENT — LOCATION DETAILED DESCRIPTION DERM
LOCATION DETAILED: RIGHT SUPERIOR LATERAL NECK
LOCATION DETAILED: LEFT RADIAL DORSAL HAND
LOCATION DETAILED: LEFT SUPERIOR VERMILION LIP
LOCATION DETAILED: 2ND WEB SPACE LEFT HAND
LOCATION DETAILED: RIGHT INFERIOR CENTRAL MALAR CHEEK
LOCATION DETAILED: LEFT CENTRAL BUCCAL CHEEK

## 2025-01-13 ENCOUNTER — LAB REQUISITION (OUTPATIENT)
Dept: LAB | Age: OVER 89
End: 2025-01-13

## 2025-01-13 DIAGNOSIS — C61 MALIGNANT NEOPLASM OF PROSTATE  (CMD): ICD-10-CM

## 2025-01-13 PROCEDURE — 84153 ASSAY OF PSA TOTAL: CPT | Performed by: CLINICAL MEDICAL LABORATORY

## 2025-01-14 LAB — PSA SERPL-MCNC: 3.21 NG/ML

## 2025-05-14 ENCOUNTER — APPOINTMENT (OUTPATIENT)
Age: OVER 89
Setting detail: DERMATOLOGY
End: 2025-05-14

## 2025-05-14 DIAGNOSIS — Z12.83 ENCOUNTER FOR SCREENING FOR MALIGNANT NEOPLASM OF SKIN: ICD-10-CM

## 2025-05-14 DIAGNOSIS — L21.8 OTHER SEBORRHEIC DERMATITIS: ICD-10-CM

## 2025-05-14 DIAGNOSIS — D18.0 HEMANGIOMA: ICD-10-CM

## 2025-05-14 DIAGNOSIS — L81.4 OTHER MELANIN HYPERPIGMENTATION: ICD-10-CM

## 2025-05-14 DIAGNOSIS — Z85.828 PERSONAL HISTORY OF OTHER MALIGNANT NEOPLASM OF SKIN: ICD-10-CM

## 2025-05-14 DIAGNOSIS — L57.0 ACTINIC KERATOSIS: ICD-10-CM

## 2025-05-14 DIAGNOSIS — L82.1 OTHER SEBORRHEIC KERATOSIS: ICD-10-CM

## 2025-05-14 PROBLEM — D18.01 HEMANGIOMA OF SKIN AND SUBCUTANEOUS TISSUE: Status: ACTIVE | Noted: 2025-05-14

## 2025-05-14 PROCEDURE — 17000 DESTRUCT PREMALG LESION: CPT

## 2025-05-14 PROCEDURE — OTHER REASSURANCE: OTHER

## 2025-05-14 PROCEDURE — OTHER SUNSCREEN RECOMMENDATIONS: OTHER

## 2025-05-14 PROCEDURE — OTHER COUNSELING: OTHER

## 2025-05-14 PROCEDURE — OTHER PRESCRIPTION: OTHER

## 2025-05-14 PROCEDURE — 99213 OFFICE O/P EST LOW 20 MIN: CPT | Mod: 25

## 2025-05-14 PROCEDURE — OTHER MIPS QUALITY: OTHER

## 2025-05-14 PROCEDURE — OTHER LIQUID NITROGEN: OTHER

## 2025-05-14 RX ORDER — KETOCONAZOLE 20 MG/ML
SHAMPOO, SUSPENSION TOPICAL
Qty: 120 | Refills: 11 | Status: ERX | COMMUNITY
Start: 2025-05-14

## 2025-05-14 ASSESSMENT — LOCATION SIMPLE DESCRIPTION DERM
LOCATION SIMPLE: LEFT ANTERIOR NECK
LOCATION SIMPLE: LEFT FOREARM
LOCATION SIMPLE: RIGHT FOREARM
LOCATION SIMPLE: ABDOMEN
LOCATION SIMPLE: CHEST
LOCATION SIMPLE: LEFT UPPER ARM
LOCATION SIMPLE: POSTERIOR SCALP
LOCATION SIMPLE: RIGHT LOWER BACK
LOCATION SIMPLE: LEFT UPPER BACK
LOCATION SIMPLE: RIGHT NOSE
LOCATION SIMPLE: LEFT LOWER BACK
LOCATION SIMPLE: LEFT CHEEK
LOCATION SIMPLE: RIGHT UPPER ARM
LOCATION SIMPLE: RIGHT UPPER BACK
LOCATION SIMPLE: RIGHT ANTERIOR NECK
LOCATION SIMPLE: LEFT EAR

## 2025-05-14 ASSESSMENT — LOCATION DETAILED DESCRIPTION DERM
LOCATION DETAILED: LEFT INFERIOR LATERAL MIDBACK
LOCATION DETAILED: RIGHT DISTAL DORSAL FOREARM
LOCATION DETAILED: RIGHT PROXIMAL RADIAL DORSAL FOREARM
LOCATION DETAILED: RIGHT NASAL SIDEWALL
LOCATION DETAILED: LEFT DISTAL POSTERIOR UPPER ARM
LOCATION DETAILED: POSTERIOR MID-PARIETAL SCALP
LOCATION DETAILED: LEFT CLAVICULAR NECK
LOCATION DETAILED: LEFT LATERAL ABDOMEN
LOCATION DETAILED: RIGHT INFERIOR LATERAL MIDBACK
LOCATION DETAILED: LEFT SUPERIOR PREAURICULAR CHEEK
LOCATION DETAILED: RIGHT SUPERIOR UPPER BACK
LOCATION DETAILED: LEFT DISTAL DORSAL FOREARM
LOCATION DETAILED: LEFT MEDIAL INFERIOR CHEST
LOCATION DETAILED: LEFT SCAPHA
LOCATION DETAILED: RIGHT RIB CAGE
LOCATION DETAILED: LEFT SUPERIOR CRUS OF ANTIHELIX
LOCATION DETAILED: RIGHT CLAVICULAR NECK
LOCATION DETAILED: LEFT MID-UPPER BACK
LOCATION DETAILED: RIGHT DISTAL POSTERIOR UPPER ARM
LOCATION DETAILED: RIGHT MEDIAL INFERIOR CHEST
LOCATION DETAILED: LEFT PROXIMAL DORSAL FOREARM

## 2025-05-14 ASSESSMENT — LOCATION ZONE DERM
LOCATION ZONE: NOSE
LOCATION ZONE: SCALP
LOCATION ZONE: TRUNK
LOCATION ZONE: NECK
LOCATION ZONE: EAR
LOCATION ZONE: FACE
LOCATION ZONE: ARM

## 2025-05-19 ENCOUNTER — LAB REQUISITION (OUTPATIENT)
Dept: LAB | Age: OVER 89
End: 2025-05-19

## 2025-05-19 DIAGNOSIS — R97.20 ELEVATED PROSTATE SPECIFIC ANTIGEN (PSA): ICD-10-CM

## 2025-05-19 LAB — PSA SERPL-MCNC: 2.79 NG/ML

## 2025-05-19 PROCEDURE — 84153 ASSAY OF PSA TOTAL: CPT | Performed by: CLINICAL MEDICAL LABORATORY
